# Patient Record
Sex: FEMALE | Race: WHITE | NOT HISPANIC OR LATINO | Employment: OTHER | URBAN - METROPOLITAN AREA
[De-identification: names, ages, dates, MRNs, and addresses within clinical notes are randomized per-mention and may not be internally consistent; named-entity substitution may affect disease eponyms.]

---

## 2017-07-17 ENCOUNTER — COMMUNICATION - HEALTHEAST (OUTPATIENT)
Dept: FAMILY MEDICINE | Facility: CLINIC | Age: 67
End: 2017-07-17

## 2017-07-17 DIAGNOSIS — E03.9 HYPOTHYROIDISM: ICD-10-CM

## 2017-09-22 ENCOUNTER — COMMUNICATION - HEALTHEAST (OUTPATIENT)
Dept: TELEHEALTH | Facility: CLINIC | Age: 67
End: 2017-09-22

## 2017-09-22 ENCOUNTER — OFFICE VISIT - HEALTHEAST (OUTPATIENT)
Dept: FAMILY MEDICINE | Facility: CLINIC | Age: 67
End: 2017-09-22

## 2017-09-22 DIAGNOSIS — E28.319 ASYMPTOMATIC PREMATURE MENOPAUSE: ICD-10-CM

## 2017-09-22 DIAGNOSIS — Z23 IMMUNIZATION DUE: ICD-10-CM

## 2017-09-22 DIAGNOSIS — G43.909 MIGRAINE: ICD-10-CM

## 2017-09-22 DIAGNOSIS — Z76.89 ENCOUNTER TO ESTABLISH CARE WITH NEW DOCTOR: ICD-10-CM

## 2017-09-22 DIAGNOSIS — Z12.31 ENCOUNTER FOR SCREENING MAMMOGRAM FOR MALIGNANT NEOPLASM OF BREAST: ICD-10-CM

## 2017-09-22 DIAGNOSIS — Z13.1 SCREENING FOR DIABETES MELLITUS: ICD-10-CM

## 2017-09-22 DIAGNOSIS — E03.9 ACQUIRED HYPOTHYROIDISM: ICD-10-CM

## 2017-09-22 DIAGNOSIS — E78.00 PURE HYPERCHOLESTEROLEMIA: ICD-10-CM

## 2017-09-22 LAB
CHOLEST SERPL-MCNC: 215 MG/DL
FASTING STATUS PATIENT QL REPORTED: YES
HDLC SERPL-MCNC: 66 MG/DL
LDLC SERPL CALC-MCNC: 128 MG/DL
TRIGL SERPL-MCNC: 103 MG/DL

## 2017-09-24 ENCOUNTER — COMMUNICATION - HEALTHEAST (OUTPATIENT)
Dept: FAMILY MEDICINE | Facility: CLINIC | Age: 67
End: 2017-09-24

## 2017-10-16 ENCOUNTER — COMMUNICATION - HEALTHEAST (OUTPATIENT)
Dept: FAMILY MEDICINE | Facility: CLINIC | Age: 67
End: 2017-10-16

## 2017-10-16 DIAGNOSIS — E03.9 HYPOTHYROIDISM: ICD-10-CM

## 2017-12-19 ENCOUNTER — COMMUNICATION - HEALTHEAST (OUTPATIENT)
Dept: FAMILY MEDICINE | Facility: CLINIC | Age: 67
End: 2017-12-19

## 2017-12-19 DIAGNOSIS — G43.909 MIGRAINE HEADACHE: ICD-10-CM

## 2018-04-30 ENCOUNTER — COMMUNICATION - HEALTHEAST (OUTPATIENT)
Dept: FAMILY MEDICINE | Facility: CLINIC | Age: 68
End: 2018-04-30

## 2018-05-29 ENCOUNTER — COMMUNICATION - HEALTHEAST (OUTPATIENT)
Dept: FAMILY MEDICINE | Facility: CLINIC | Age: 68
End: 2018-05-29

## 2018-06-01 ENCOUNTER — COMMUNICATION - HEALTHEAST (OUTPATIENT)
Dept: TELEHEALTH | Facility: CLINIC | Age: 68
End: 2018-06-01

## 2018-06-01 ENCOUNTER — OFFICE VISIT - HEALTHEAST (OUTPATIENT)
Dept: FAMILY MEDICINE | Facility: CLINIC | Age: 68
End: 2018-06-01

## 2018-06-01 DIAGNOSIS — Z12.11 ENCOUNTER FOR SCREENING FOR MALIGNANT NEOPLASM OF COLON: ICD-10-CM

## 2018-06-01 DIAGNOSIS — Z12.4 ENCOUNTER FOR SCREENING FOR MALIGNANT NEOPLASM OF CERVIX: ICD-10-CM

## 2018-06-01 DIAGNOSIS — Z13.220 ENCOUNTER FOR SCREENING FOR LIPOID DISORDERS: ICD-10-CM

## 2018-06-01 DIAGNOSIS — E03.9 ACQUIRED HYPOTHYROIDISM: ICD-10-CM

## 2018-06-01 DIAGNOSIS — Z78.0 POSTMENOPAUSAL: ICD-10-CM

## 2018-06-01 DIAGNOSIS — Z01.419 ENCOUNTER FOR GYNECOLOGICAL EXAMINATION WITHOUT ABNORMAL FINDING: ICD-10-CM

## 2018-06-01 DIAGNOSIS — Z12.31 ENCOUNTER FOR SCREENING MAMMOGRAM FOR MALIGNANT NEOPLASM OF BREAST: ICD-10-CM

## 2018-06-01 DIAGNOSIS — Z00.00 MEDICARE ANNUAL WELLNESS VISIT, INITIAL: ICD-10-CM

## 2018-06-01 LAB
CHOLEST SERPL-MCNC: 207 MG/DL
FASTING STATUS PATIENT QL REPORTED: YES
HDLC SERPL-MCNC: 59 MG/DL
LDLC SERPL CALC-MCNC: 134 MG/DL
TRIGL SERPL-MCNC: 72 MG/DL

## 2018-06-01 ASSESSMENT — MIFFLIN-ST. JEOR: SCORE: 1257.87

## 2018-06-04 LAB
HPV SOURCE: NORMAL
HUMAN PAPILLOMA VIRUS 16 DNA: NEGATIVE
HUMAN PAPILLOMA VIRUS 18 DNA: NEGATIVE
HUMAN PAPILLOMA VIRUS FINAL DIAGNOSIS: NORMAL
HUMAN PAPILLOMA VIRUS OTHER HR: NEGATIVE
SPECIMEN DESCRIPTION: NORMAL

## 2018-06-07 ENCOUNTER — COMMUNICATION - HEALTHEAST (OUTPATIENT)
Dept: FAMILY MEDICINE | Facility: CLINIC | Age: 68
End: 2018-06-07

## 2018-06-08 ENCOUNTER — COMMUNICATION - HEALTHEAST (OUTPATIENT)
Dept: FAMILY MEDICINE | Facility: CLINIC | Age: 68
End: 2018-06-08

## 2018-06-08 LAB
BKR LAB AP ABNORMAL BLEEDING: NO
BKR LAB AP BIRTH CONTROL/HORMONES: NORMAL
BKR LAB AP CERVICAL APPEARANCE: NORMAL
BKR LAB AP GYN ADEQUACY: NORMAL
BKR LAB AP GYN INTERPRETATION: NORMAL
BKR LAB AP HPV REFLEX: NORMAL
BKR LAB AP LMP: NORMAL
BKR LAB AP PATIENT STATUS: NORMAL
BKR LAB AP PREVIOUS ABNORMAL: NO
BKR LAB AP PREVIOUS NORMAL: 2012
HIGH RISK?: NO
PATH REPORT.COMMENTS IMP SPEC: NORMAL
RESULT FLAG (HE HISTORICAL CONVERSION): NORMAL

## 2018-06-19 ENCOUNTER — RECORDS - HEALTHEAST (OUTPATIENT)
Dept: ADMINISTRATIVE | Facility: OTHER | Age: 68
End: 2018-06-19

## 2018-06-20 ENCOUNTER — HOSPITAL ENCOUNTER (OUTPATIENT)
Dept: MAMMOGRAPHY | Facility: CLINIC | Age: 68
Discharge: HOME | End: 2018-06-20
Attending: FAMILY MEDICINE

## 2018-06-20 ENCOUNTER — RECORDS - HEALTHEAST (OUTPATIENT)
Dept: BONE DENSITY | Facility: CLINIC | Age: 68
End: 2018-06-20

## 2018-06-20 ENCOUNTER — RECORDS - HEALTHEAST (OUTPATIENT)
Dept: ADMINISTRATIVE | Facility: OTHER | Age: 68
End: 2018-06-20

## 2018-06-20 DIAGNOSIS — Z78.0 ASYMPTOMATIC MENOPAUSAL STATE: ICD-10-CM

## 2018-06-20 DIAGNOSIS — Z12.31 ENCOUNTER FOR SCREENING MAMMOGRAM FOR MALIGNANT NEOPLASM OF BREAST: ICD-10-CM

## 2018-07-24 ENCOUNTER — COMMUNICATION - HEALTHEAST (OUTPATIENT)
Dept: FAMILY MEDICINE | Facility: CLINIC | Age: 68
End: 2018-07-24

## 2018-08-01 ENCOUNTER — COMMUNICATION - HEALTHEAST (OUTPATIENT)
Dept: FAMILY MEDICINE | Facility: CLINIC | Age: 68
End: 2018-08-01

## 2018-08-01 DIAGNOSIS — E03.9 HYPOTHYROIDISM: ICD-10-CM

## 2018-09-17 ENCOUNTER — HOSPITAL ENCOUNTER (OUTPATIENT)
Dept: MAMMOGRAPHY | Facility: CLINIC | Age: 68
Discharge: HOME | End: 2018-09-17
Attending: FAMILY MEDICINE

## 2018-11-05 ENCOUNTER — COMMUNICATION - HEALTHEAST (OUTPATIENT)
Dept: FAMILY MEDICINE | Facility: CLINIC | Age: 68
End: 2018-11-05

## 2018-11-05 DIAGNOSIS — E03.9 HYPOTHYROIDISM: ICD-10-CM

## 2018-11-05 DIAGNOSIS — E03.9 HYPOTHYROID: ICD-10-CM

## 2018-11-13 ENCOUNTER — AMBULATORY - HEALTHEAST (OUTPATIENT)
Dept: NURSING | Facility: CLINIC | Age: 68
End: 2018-11-13

## 2018-11-13 ENCOUNTER — COMMUNICATION - HEALTHEAST (OUTPATIENT)
Dept: FAMILY MEDICINE | Facility: CLINIC | Age: 68
End: 2018-11-13

## 2018-11-13 ENCOUNTER — AMBULATORY - HEALTHEAST (OUTPATIENT)
Dept: LAB | Facility: CLINIC | Age: 68
End: 2018-11-13

## 2018-11-13 DIAGNOSIS — E03.9 HYPOTHYROIDISM: ICD-10-CM

## 2018-11-13 DIAGNOSIS — Z23 NEED FOR IMMUNIZATION AGAINST INFLUENZA: ICD-10-CM

## 2018-11-13 LAB — TSH SERPL DL<=0.005 MIU/L-ACNC: 0.61 UIU/ML (ref 0.3–5)

## 2018-11-23 ENCOUNTER — COMMUNICATION - HEALTHEAST (OUTPATIENT)
Dept: FAMILY MEDICINE | Facility: CLINIC | Age: 68
End: 2018-11-23

## 2018-11-23 DIAGNOSIS — E03.9 HYPOTHYROIDISM: ICD-10-CM

## 2019-03-17 ENCOUNTER — COMMUNICATION - HEALTHEAST (OUTPATIENT)
Dept: FAMILY MEDICINE | Facility: CLINIC | Age: 69
End: 2019-03-17

## 2019-03-17 DIAGNOSIS — G43.909 MIGRAINE HEADACHE: ICD-10-CM

## 2019-11-27 ENCOUNTER — COMMUNICATION - HEALTHEAST (OUTPATIENT)
Dept: FAMILY MEDICINE | Facility: CLINIC | Age: 69
End: 2019-11-27

## 2019-11-27 DIAGNOSIS — E03.9 ACQUIRED HYPOTHYROIDISM: ICD-10-CM

## 2020-01-02 ENCOUNTER — COMMUNICATION - HEALTHEAST (OUTPATIENT)
Dept: FAMILY MEDICINE | Facility: CLINIC | Age: 70
End: 2020-01-02

## 2020-01-02 DIAGNOSIS — E03.9 ACQUIRED HYPOTHYROIDISM: ICD-10-CM

## 2020-01-06 ENCOUNTER — COMMUNICATION - HEALTHEAST (OUTPATIENT)
Dept: TELEHEALTH | Facility: CLINIC | Age: 70
End: 2020-01-06

## 2020-01-06 ENCOUNTER — OFFICE VISIT - HEALTHEAST (OUTPATIENT)
Dept: FAMILY MEDICINE | Facility: CLINIC | Age: 70
End: 2020-01-06

## 2020-01-06 DIAGNOSIS — B35.1 TOENAIL FUNGUS: ICD-10-CM

## 2020-01-06 DIAGNOSIS — G43.709 CHRONIC MIGRAINE WITHOUT AURA WITHOUT STATUS MIGRAINOSUS, NOT INTRACTABLE: ICD-10-CM

## 2020-01-06 DIAGNOSIS — E03.9 ACQUIRED HYPOTHYROIDISM: ICD-10-CM

## 2020-01-06 DIAGNOSIS — M81.0 AGE RELATED OSTEOPOROSIS, UNSPECIFIED PATHOLOGICAL FRACTURE PRESENCE: ICD-10-CM

## 2020-01-06 DIAGNOSIS — Z11.59 ENCOUNTER FOR HEPATITIS C SCREENING TEST FOR LOW RISK PATIENT: ICD-10-CM

## 2020-01-06 DIAGNOSIS — E78.00 PURE HYPERCHOLESTEROLEMIA: ICD-10-CM

## 2020-01-06 DIAGNOSIS — J02.9 SORE THROAT: ICD-10-CM

## 2020-01-06 DIAGNOSIS — Z23 IMMUNIZATION DUE: ICD-10-CM

## 2020-01-06 LAB
ALBUMIN SERPL-MCNC: 3.7 G/DL (ref 3.5–5)
ALP SERPL-CCNC: 97 U/L (ref 45–120)
ALT SERPL W P-5'-P-CCNC: 11 U/L (ref 0–45)
ANION GAP SERPL CALCULATED.3IONS-SCNC: 10 MMOL/L (ref 5–18)
AST SERPL W P-5'-P-CCNC: 15 U/L (ref 0–40)
BILIRUB SERPL-MCNC: 0.7 MG/DL (ref 0–1)
BUN SERPL-MCNC: 20 MG/DL (ref 8–22)
CALCIUM SERPL-MCNC: 9.4 MG/DL (ref 8.5–10.5)
CHLORIDE BLD-SCNC: 104 MMOL/L (ref 98–107)
CHOLEST SERPL-MCNC: 200 MG/DL
CO2 SERPL-SCNC: 25 MMOL/L (ref 22–31)
CREAT SERPL-MCNC: 0.85 MG/DL (ref 0.6–1.1)
DEPRECATED S PYO AG THROAT QL EIA: NORMAL
FASTING STATUS PATIENT QL REPORTED: NO
GFR SERPL CREATININE-BSD FRML MDRD: >60 ML/MIN/1.73M2
GLUCOSE BLD-MCNC: 81 MG/DL (ref 70–125)
HDLC SERPL-MCNC: 50 MG/DL
LDLC SERPL CALC-MCNC: 134 MG/DL
POTASSIUM BLD-SCNC: 4.2 MMOL/L (ref 3.5–5)
PROT SERPL-MCNC: 6.5 G/DL (ref 6–8)
SODIUM SERPL-SCNC: 139 MMOL/L (ref 136–145)
TRIGL SERPL-MCNC: 81 MG/DL
TSH SERPL DL<=0.005 MIU/L-ACNC: 0.66 UIU/ML (ref 0.3–5)

## 2020-01-06 ASSESSMENT — MIFFLIN-ST. JEOR: SCORE: 1214.22

## 2020-01-07 ENCOUNTER — COMMUNICATION - HEALTHEAST (OUTPATIENT)
Dept: FAMILY MEDICINE | Facility: CLINIC | Age: 70
End: 2020-01-07

## 2020-01-07 ENCOUNTER — RECORDS - HEALTHEAST (OUTPATIENT)
Dept: ADMINISTRATIVE | Facility: OTHER | Age: 70
End: 2020-01-07

## 2020-01-07 LAB
25(OH)D3 SERPL-MCNC: 21.1 NG/ML (ref 30–80)
GROUP A STREP BY PCR: NORMAL
HCV AB SERPL QL IA: NEGATIVE

## 2020-12-13 ENCOUNTER — COMMUNICATION - HEALTHEAST (OUTPATIENT)
Dept: FAMILY MEDICINE | Facility: CLINIC | Age: 70
End: 2020-12-13

## 2020-12-13 DIAGNOSIS — G43.709 CHRONIC MIGRAINE WITHOUT AURA WITHOUT STATUS MIGRAINOSUS, NOT INTRACTABLE: ICD-10-CM

## 2021-01-02 ENCOUNTER — COMMUNICATION - HEALTHEAST (OUTPATIENT)
Dept: FAMILY MEDICINE | Facility: CLINIC | Age: 71
End: 2021-01-02

## 2021-01-02 DIAGNOSIS — E03.9 ACQUIRED HYPOTHYROIDISM: ICD-10-CM

## 2021-01-08 ENCOUNTER — COMMUNICATION - HEALTHEAST (OUTPATIENT)
Dept: FAMILY MEDICINE | Facility: CLINIC | Age: 71
End: 2021-01-08

## 2021-01-08 DIAGNOSIS — G43.709 CHRONIC MIGRAINE WITHOUT AURA WITHOUT STATUS MIGRAINOSUS, NOT INTRACTABLE: ICD-10-CM

## 2021-01-30 ENCOUNTER — COMMUNICATION - HEALTHEAST (OUTPATIENT)
Dept: FAMILY MEDICINE | Facility: CLINIC | Age: 71
End: 2021-01-30

## 2021-01-30 DIAGNOSIS — E03.9 ACQUIRED HYPOTHYROIDISM: ICD-10-CM

## 2021-02-23 ENCOUNTER — COMMUNICATION - HEALTHEAST (OUTPATIENT)
Dept: FAMILY MEDICINE | Facility: CLINIC | Age: 71
End: 2021-02-23

## 2021-02-23 DIAGNOSIS — E03.9 ACQUIRED HYPOTHYROIDISM: ICD-10-CM

## 2021-03-16 ENCOUNTER — AMBULATORY - HEALTHEAST (OUTPATIENT)
Dept: FAMILY MEDICINE | Facility: CLINIC | Age: 71
End: 2021-03-16

## 2021-03-16 ENCOUNTER — OFFICE VISIT - HEALTHEAST (OUTPATIENT)
Dept: FAMILY MEDICINE | Facility: CLINIC | Age: 71
End: 2021-03-16

## 2021-03-16 DIAGNOSIS — E78.00 PURE HYPERCHOLESTEROLEMIA: ICD-10-CM

## 2021-03-16 DIAGNOSIS — Z12.11 SCREENING FOR COLON CANCER: ICD-10-CM

## 2021-03-16 DIAGNOSIS — M81.0 AGE RELATED OSTEOPOROSIS, UNSPECIFIED PATHOLOGICAL FRACTURE PRESENCE: ICD-10-CM

## 2021-03-16 DIAGNOSIS — G43.709 CHRONIC MIGRAINE WITHOUT AURA WITHOUT STATUS MIGRAINOSUS, NOT INTRACTABLE: ICD-10-CM

## 2021-03-16 DIAGNOSIS — Z12.31 ENCOUNTER FOR SCREENING MAMMOGRAM FOR MALIGNANT NEOPLASM OF BREAST: ICD-10-CM

## 2021-03-16 DIAGNOSIS — E03.9 ACQUIRED HYPOTHYROIDISM: ICD-10-CM

## 2021-03-16 DIAGNOSIS — E55.9 VITAMIN D DEFICIENCY: ICD-10-CM

## 2021-03-16 LAB
ANION GAP SERPL CALCULATED.3IONS-SCNC: 9 MMOL/L (ref 5–18)
BUN SERPL-MCNC: 21 MG/DL (ref 8–22)
CALCIUM SERPL-MCNC: 9.1 MG/DL (ref 8.5–10.5)
CHLORIDE BLD-SCNC: 107 MMOL/L (ref 98–107)
CHOLEST SERPL-MCNC: 224 MG/DL
CO2 SERPL-SCNC: 24 MMOL/L (ref 22–31)
CREAT SERPL-MCNC: 0.86 MG/DL (ref 0.6–1.1)
FASTING STATUS PATIENT QL REPORTED: YES
GFR SERPL CREATININE-BSD FRML MDRD: >60 ML/MIN/1.73M2
GLUCOSE BLD-MCNC: 93 MG/DL (ref 70–125)
HDLC SERPL-MCNC: 58 MG/DL
LDLC SERPL CALC-MCNC: 149 MG/DL
POTASSIUM BLD-SCNC: 4 MMOL/L (ref 3.5–5)
SODIUM SERPL-SCNC: 140 MMOL/L (ref 136–145)
TRIGL SERPL-MCNC: 87 MG/DL
TSH SERPL DL<=0.005 MIU/L-ACNC: 0.4 UIU/ML (ref 0.3–5)

## 2021-03-16 ASSESSMENT — MIFFLIN-ST. JEOR: SCORE: 1270.24

## 2021-03-17 LAB — 25(OH)D3 SERPL-MCNC: 26.6 NG/ML (ref 30–80)

## 2021-03-19 ENCOUNTER — COMMUNICATION - HEALTHEAST (OUTPATIENT)
Dept: FAMILY MEDICINE | Facility: CLINIC | Age: 71
End: 2021-03-19

## 2021-03-19 DIAGNOSIS — E03.9 ACQUIRED HYPOTHYROIDISM: ICD-10-CM

## 2021-03-19 DIAGNOSIS — G43.709 CHRONIC MIGRAINE WITHOUT AURA WITHOUT STATUS MIGRAINOSUS, NOT INTRACTABLE: ICD-10-CM

## 2021-03-22 RX ORDER — LEVOTHYROXINE SODIUM 88 UG/1
TABLET ORAL
Qty: 90 TABLET | Refills: 3 | Status: SHIPPED | OUTPATIENT
Start: 2021-03-22 | End: 2022-03-29

## 2021-03-23 RX ORDER — SUMATRIPTAN 50 MG/1
50 TABLET, FILM COATED ORAL
Qty: 12 TABLET | Refills: 3 | Status: SHIPPED | OUTPATIENT
Start: 2021-03-23 | End: 2022-05-05

## 2021-05-01 ENCOUNTER — HEALTH MAINTENANCE LETTER (OUTPATIENT)
Age: 71
End: 2021-05-01

## 2021-05-25 ENCOUNTER — RECORDS - HEALTHEAST (OUTPATIENT)
Dept: ADMINISTRATIVE | Facility: CLINIC | Age: 71
End: 2021-05-25

## 2021-05-26 ENCOUNTER — RECORDS - HEALTHEAST (OUTPATIENT)
Dept: ADMINISTRATIVE | Facility: CLINIC | Age: 71
End: 2021-05-26

## 2021-05-27 ENCOUNTER — RECORDS - HEALTHEAST (OUTPATIENT)
Dept: ADMINISTRATIVE | Facility: CLINIC | Age: 71
End: 2021-05-27

## 2021-05-29 ENCOUNTER — RECORDS - HEALTHEAST (OUTPATIENT)
Dept: ADMINISTRATIVE | Facility: CLINIC | Age: 71
End: 2021-05-29

## 2021-05-31 ENCOUNTER — RECORDS - HEALTHEAST (OUTPATIENT)
Dept: ADMINISTRATIVE | Facility: CLINIC | Age: 71
End: 2021-05-31

## 2021-05-31 VITALS — WEIGHT: 154.3 LBS | BODY MASS INDEX: 24.17 KG/M2

## 2021-06-01 VITALS — HEIGHT: 67 IN | BODY MASS INDEX: 23.92 KG/M2 | WEIGHT: 152.4 LBS

## 2021-06-03 NOTE — TELEPHONE ENCOUNTER
RN cannot approve Refill Request    RN can NOT refill this medication PCP messaged that patient is overdue for Labs and Office Visit. Last office visit: 9/22/2017 Bertha Barrow MD Last Physical: 6/1/2018 Last MTM visit: Visit date not found Last visit same specialty: 9/22/2017 Bertha Barrow MD.  Next visit within 3 mo: Visit date not found  Next physical within 3 mo: Visit date not found      Guillermo Christianson, [facility] Triage/Med Refill 11/28/2019    Requested Prescriptions   Pending Prescriptions Disp Refills     levothyroxine (SYNTHROID, LEVOTHROID) 88 MCG tablet [Pharmacy Med Name: LEVOTHYROXINE 88 MCG TABLET] 90 tablet 2     Sig: TAKE 1 TABLET BY MOUTH DAILY AT 6AM       Thyroid Hormones Protocol Failed - 11/27/2019  6:25 AM        Failed - Provider visit in past 12 months or next 3 months     Last office visit with prescriber/PCP: 9/22/2017 Bertha Barrow MD OR same dept: Visit date not found OR same specialty: 9/22/2017 Bertha Barrow MD  Last physical: 6/1/2018 Last MTM visit: Visit date not found   Next visit within 3 mo: Visit date not found  Next physical within 3 mo: Visit date not found  Prescriber OR PCP: Bertha Barrow MD (Betsy)  Last diagnosis associated with med order: There are no diagnoses linked to this encounter.  If protocol passes may refill for 12 months if within 3 months of last provider visit (or a total of 15 months).             Failed - TSH on file in past 12 months for patient age 12 & older     TSH   Date Value Ref Range Status   11/13/2018 0.61 0.30 - 5.00 uIU/mL Final

## 2021-06-04 VITALS
SYSTOLIC BLOOD PRESSURE: 124 MMHG | WEIGHT: 152.4 LBS | HEART RATE: 72 BPM | HEIGHT: 65 IN | DIASTOLIC BLOOD PRESSURE: 72 MMHG | BODY MASS INDEX: 25.39 KG/M2

## 2021-06-04 NOTE — TELEPHONE ENCOUNTER
RN cannot approve Refill Request    RN can NOT refill this medication PCP messaged that patient is overdue for Labs and Office Visit. Last office visit: Visit date not found Last Physical: Visit date not found Last MTM visit: Visit date not found Last visit same specialty: 9/22/2017 Bertha Barrow MD.  Next visit within 3 mo: Visit date not found  Next physical within 3 mo: Visit date not found      Guillermo Christianson, [facility] Triage/Med Refill 1/4/2020    Requested Prescriptions   Pending Prescriptions Disp Refills     levothyroxine (SYNTHROID, LEVOTHROID) 88 MCG tablet [Pharmacy Med Name: LEVOTHYROXINE 88 MCG TABLET] 30 tablet 0     Sig: TAKE 1 TABLET BY MOUTH DAILY AT 6AM       Thyroid Hormones Protocol Failed - 1/2/2020  1:36 PM        Failed - Provider visit in past 12 months or next 3 months     Last office visit with prescriber/PCP: Visit date not found OR same dept: Visit date not found OR same specialty: 9/22/2017 Bertha Barrow MD  Last physical: Visit date not found Last MTM visit: Visit date not found   Next visit within 3 mo: Visit date not found  Next physical within 3 mo: Visit date not found  Prescriber OR PCP: Ely Lay MD  Last diagnosis associated with med order: 1. Acquired hypothyroidism  - levothyroxine (SYNTHROID, LEVOTHROID) 88 MCG tablet [Pharmacy Med Name: LEVOTHYROXINE 88 MCG TABLET]; TAKE 1 TABLET BY MOUTH DAILY AT 6AM  Dispense: 30 tablet; Refill: 0    If protocol passes may refill for 12 months if within 3 months of last provider visit (or a total of 15 months).             Failed - TSH on file in past 12 months for patient age 12 & older     TSH   Date Value Ref Range Status   11/13/2018 0.61 0.30 - 5.00 uIU/mL Final

## 2021-06-04 NOTE — TELEPHONE ENCOUNTER
Patient is asking for refill of thyroid medication but has not been seen in a year and a half and has not had a lab and needs an appointment.  Please call the patient and let her know she cannot get refills without scheduling an appointment.  When she is scheduled, must be within 30 days from now, I will give her a 30-day refill.  Please send this message back to me.

## 2021-06-05 VITALS
WEIGHT: 156 LBS | DIASTOLIC BLOOD PRESSURE: 78 MMHG | HEART RATE: 70 BPM | HEIGHT: 67 IN | BODY MASS INDEX: 24.48 KG/M2 | OXYGEN SATURATION: 98 % | SYSTOLIC BLOOD PRESSURE: 124 MMHG

## 2021-06-05 NOTE — TELEPHONE ENCOUNTER
Pt was seen today. Left message on voicemail prior to seeing this appt.     Disregard request for visit. H.DeGree, CMA

## 2021-06-05 NOTE — PROGRESS NOTES
"  SUBJECTIVE: Joseline Welch is a 69 y.o. White or  female who presents today for her med check but also has some complaints.  I have not seen her since June 2018 when she had her welcome to Medicare exam.  She has a history of hypothyroidism and I refused to refill meds until she was seen for lab work.  She also has some mild diet-controlled hyperlipidemia which needs to be followed.  She has osteoporosis and a history of migraine for which she needs medication refilled.  Today she complains of sore throat that she has had for the last 2 weeks.  She also has a cough that goes with it.  We discussed it is likely viral in nature.  We discussed that likely does not need to be treated with antibiotic.  She also shows me her big toe on her right foot.  She has a dark lesion underneath the nail.  She, on questioning, is unsure how long she has had it.  She thinks perhaps at least 2 months.  She denies any trauma to the toe.  She cannot tell me if it seems to be growing out along with the nail.  We discussed that is likely a fungal infection, but I could not really rule out melanoma and so I suggest she be seen by dermatology.  We also discussed that she is due for some immunizations.    OBJECTIVE: /72 (Patient Site: Right Arm, Patient Position: Sitting, Cuff Size: Adult Regular)   Pulse 72   Ht 5' 4.5\" (1.638 m)   Wt 152 lb 6.4 oz (69.1 kg)   Breastfeeding No   BMI 25.76 kg/m    General: Relatively healthy appearing normal weight older female in no acute distress  HEENT: Eyes clear, nose without noticeable rhinorrhea, throat is mildly erythematous without lesions or exudates.  Heart: Regular rate and rhythm without murmur  Lungs: Clear bilaterally  Abdomen: Soft, nontender  Extremities: Warm, dry and without edema.  Right great toe has a darkened lesion underneath the toenail measuring about half centimeter.    Rapid strep was negative    ASSESSMENT & PLAN:     1. Sore throat  Nursing swab the " patient at patient's request.  Likely viral in nature.  - Rapid Strep A Screen- Throat Swab  - Group A Strep, RNA Direct Detection, Throat    2. Acquired hypothyroidism  Needs monitoring for refill of medication.  - Thyroid Stimulating Hormone (TSH)  - levothyroxine (SYNTHROID, LEVOTHROID) 88 MCG tablet; Take 1 tablet (88 mcg total) by mouth Daily at 6:00 am.  Dispense: 90 tablet; Refill: 3    3. Essential Hypercholesterolemia  Mild and diet controlled, needs monitoring  - Lipid Cascade  - Comprehensive Metabolic Panel    4. Age related osteoporosis, unspecified pathological fracture presence  Will monitor her vitamin D level.  - Vitamin D, Total (25-Hydroxy)    5. Chronic migraine without aura without status migrainosus, not intractable  In need of refills today.  Relatively well controlled overall.  - SUMAtriptan (IMITREX) 50 MG tablet; TAKE 1 TABLET BY MOUTH AT IMMEDIATE ONSET OF HEADACHE. MAY REPEAT DOSE ONE TIME.  Dispense: 9 tablet; Refill: 2    6. Toenail fungus  I cannot rule out melanoma and since this is under the nail I would like her to see dermatology.  They can discuss treatment.  - Ambulatory referral to Dermatology    7. Immunization due  Willing to do these today.  - Pneumococcal polysaccharide vaccine 23-valent greater than or equal to 1yo subcutaneous/IM  - Influenza High Dose,Seasonal,PF 65+ Yrs    8. Encounter for hepatitis C screening test for low risk patient  - Hepatitis C Antibody (Anti-HCV)    I will get back to her on her lab results by mail and only call with grossly abnormal values.  Referrals given to Derm.  Patient received immunizations.  She can see me back in 1 years time if all is well.    40 minutes spent together with the patient today, more than 50% spent in counseling, discussing the above topics.        Patient Active Problem List   Diagnosis     Hypothyroidism     Essential Hypercholesterolemia     Migraine Headache     Insomnia     Age related osteoporosis       No current  outpatient medications on file prior to visit.     No current facility-administered medications on file prior to visit.

## 2021-06-13 NOTE — TELEPHONE ENCOUNTER
Refill Approved    Rx renewed per Medication Renewal Policy. Medication was last renewed on 1/6/20.    Sandra Sunshine, [facility] Triage/Med Refill 12/14/2020     Requested Prescriptions   Pending Prescriptions Disp Refills     SUMAtriptan (IMITREX) 50 MG tablet [Pharmacy Med Name: SUMATRIPTAN SUCC 50 MG TABLET] 12 tablet 2     Sig: TAKE 1 TABLET AT ONSET, MAY REPEAT 1 TIME AS NEEDED       Triptans Refill Protocol Passed - 12/13/2020 12:56 PM        Passed - PCP or prescribing provider visit in past 12 months       Last office visit with prescriber/PCP: 1/6/2020 Bertha Barrow MD OR same dept: 1/6/2020 Bertha Barrow MD OR same specialty: 1/6/2020 Bertha Barrow MD  Last physical: 6/1/2018 Last MTM visit: Visit date not found   Next visit within 3 mo: Visit date not found  Next physical within 3 mo: Visit date not found  Prescriber OR PCP: Carla) CHRISTIANO Barrow MD  Last diagnosis associated with med order: 1. Chronic migraine without aura without status migrainosus, not intractable  - SUMAtriptan (IMITREX) 50 MG tablet [Pharmacy Med Name: SUMATRIPTAN SUCC 50 MG TABLET]; TAKE 1 TABLET AT ONSET, MAY REPEAT 1 TIME AS NEEDED  Dispense: 12 tablet; Refill: 2    If protocol passes may refill for 12 months if within 3 months of last provider visit (or a total of 15 months).

## 2021-06-13 NOTE — PROGRESS NOTES
SUBJECTIVE: Joseline Welch is a 67 y.o.  female who presents today for a med check and to establish care with me as Dr. Peterson has retired.  She is relatively healthy and has hyperlipidemia and hypothyroidism.  She is in need of a Synthroid refill and she has not had thyroid lab work in greater than a year.  She also has some migraine headaches and uses Imitrex.  But otherwise is pretty healthy.  She has 5 grandchildren and is quite active in their lives.  For preventative measures she has not had a DEXA scan.  Her last mammogram was in 2012.  She has not had a colonoscopy.  She is due for Pap next year as her last Pap was in April 2015 but no HPV was tested.  She is due for the flu vaccine as well as a Prevnar 13.      We went through her past medical, surgical, family and social histories and they were updated in her chart.    OBJECTIVE: /68  Pulse 72  Wt 154 lb 4.8 oz (70 kg)  BMI 24.17 kg/m2  General: Healthy appearing normal weight older female in no acute distress  Heart: Regular rate and rhythm without murmur  Lungs: Clear bilaterally  Abdomen: Soft, nontender  Extremities: Warm, dry and without edema    ASSESSMENT & PLAN:    1. Acquired hypothyroidism  Thyroid Cascade   2. Essential Hypercholesterolemia  Lipid Cascade FASTING   3. Migraine     4. Screening for diabetes mellitus  Comprehensive Metabolic Panel   5. Encounter for screening mammogram for malignant neoplasm of breast   Mammo Screening Bilateral   6. Asymptomatic premature menopause   DXA Bone Density Scan   7. Immunization due  Pneumococcal conjugate vaccine 13-valent 6wks-17yrs; >50yrs    Influenza, Seasonal,Quad Inj, 36+ MOS   8. Encounter to establish care with new doctor       I am doing the above-mentioned lab work and I will get back to her on those results by mail and only call with grossly abnormal values.  I ordered her a screening mammogram and DEXA scan.  She was given the Prevnar 13 and influenza vaccines  today.  She is to follow-up in 6 months time for a welcome to Medicare exam.    Patient Active Problem List   Diagnosis     Hypothyroidism     Essential Hypercholesterolemia     Migraine Headache     Insomnia       Current Outpatient Prescriptions on File Prior to Visit   Medication Sig Dispense Refill     levothyroxine (SYNTHROID, LEVOTHROID) 88 MCG tablet Take 1 tablet (88 mcg total) by mouth Daily at 6:00 am. 90 tablet 0     SUMAtriptan (IMITREX) 50 MG tablet TAKE 1 TABLET BY MOUTH IMMEDIATELY WITH HEADACHE, MAY REPEAT X1 9 tablet 7     No current facility-administered medications on file prior to visit.

## 2021-06-14 NOTE — TELEPHONE ENCOUNTER
RN cannot approve Refill Request    RN can NOT refill this medication PCP messaged that patient is overdue for Office Visit. Last office visit: 1/6/2020 Bertha Barrow MD Last Physical: 6/1/2018 Last MTM visit: Visit date not found Last visit same specialty: 1/6/2020 Bertha Barrow MD.  Next visit within 3 mo: Visit date not found  Next physical within 3 mo: Visit date not found      Selma Peña, [facility] Triage/Med Refill 1/8/2021    Requested Prescriptions   Pending Prescriptions Disp Refills     SUMAtriptan (IMITREX) 50 MG tablet [Pharmacy Med Name: SUMATRIPTAN SUCC 50 MG TABLET] 12 tablet 0     Sig: TAKE 1 TABLET AT ONSET, MAY REPEAT 1 TIME AS NEEDED       Triptans Refill Protocol Failed - 1/8/2021  2:00 AM        Failed - PCP or prescribing provider visit in past 12 months       Last office visit with prescriber/PCP: 1/6/2020 Bertha Barrow MD OR same dept: Visit date not found OR same specialty: 1/6/2020 Bertha Barrow MD  Last physical: 6/1/2018 Last MTM visit: Visit date not found   Next visit within 3 mo: Visit date not found  Next physical within 3 mo: Visit date not found  Prescriber OR PCP: Bertha Barrow MD (Betsy)  Last diagnosis associated with med order: 1. Chronic migraine without aura without status migrainosus, not intractable  - SUMAtriptan (IMITREX) 50 MG tablet [Pharmacy Med Name: SUMATRIPTAN SUCC 50 MG TABLET]; TAKE 1 TABLET AT ONSET, MAY REPEAT 1 TIME AS NEEDED  Dispense: 12 tablet; Refill: 0    If protocol passes may refill for 12 months if within 3 months of last provider visit (or a total of 15 months).

## 2021-06-14 NOTE — TELEPHONE ENCOUNTER
Patient needs appointment for med check and lab for further refills.  30-day refill of thyroid medicine was sent.  Please get her scheduled.  I have openings tomorrow

## 2021-06-14 NOTE — TELEPHONE ENCOUNTER
RN cannot approve Refill Request    RN can NOT refill this medication PCP messaged that patient is overdue for Office Visit. Last office visit: 1/6/2020 Bertha Barrow MD Last Physical: 6/1/2018 Last MTM visit: Visit date not found Last visit same specialty: 1/6/2020 Bertha Barrow MD.  Next visit within 3 mo: Visit date not found  Next physical within 3 mo: Visit date not found      Selma Peña, [facility] Triage/Med Refill 1/30/2021    Requested Prescriptions   Pending Prescriptions Disp Refills     levothyroxine (SYNTHROID, LEVOTHROID) 88 MCG tablet [Pharmacy Med Name: LEVOTHYROXINE 88 MCG TABLET] 30 tablet 0     Sig: TAKE 1 TABLET BY MOUTH EVERY DAY AT 6 AM       Thyroid Hormones Protocol Failed - 1/30/2021  8:30 AM        Failed - Provider visit in past 12 months or next 3 months     Last office visit with prescriber/PCP: 1/6/2020 Bertha Barrow MD OR same dept: Visit date not found OR same specialty: 1/6/2020 Bertha Barrow MD  Last physical: 6/1/2018 Last MTM visit: Visit date not found   Next visit within 3 mo: Visit date not found  Next physical within 3 mo: Visit date not found  Prescriber OR PCP: Bertha Barrow MD (Betsy)  Last diagnosis associated with med order: 1. Acquired hypothyroidism  - levothyroxine (SYNTHROID, LEVOTHROID) 88 MCG tablet [Pharmacy Med Name: LEVOTHYROXINE 88 MCG TABLET]; TAKE 1 TABLET BY MOUTH EVERY DAY AT 6 AM  Dispense: 30 tablet; Refill: 0    If protocol passes may refill for 12 months if within 3 months of last provider visit (or a total of 15 months).             Failed - TSH on file in past 12 months for patient age 12 & older     TSH   Date Value Ref Range Status   01/06/2020 0.66 0.30 - 5.00 uIU/mL Final

## 2021-06-14 NOTE — TELEPHONE ENCOUNTER
Due to be seen with labs    Rx renewed per Medication Renewal Policy. Medication was last renewed on 1/6/20.    Sandra Sunshine, [facility] Triage/Med Refill 1/4/2021     Requested Prescriptions   Pending Prescriptions Disp Refills     levothyroxine (SYNTHROID, LEVOTHROID) 88 MCG tablet [Pharmacy Med Name: LEVOTHYROXINE 88 MCG TABLET] 90 tablet 3     Sig: TAKE 1 TABLET EVERY DAY AT 6 AM       Thyroid Hormones Protocol Passed - 1/2/2021  9:40 AM        Passed - Provider visit in past 12 months or next 3 months     Last office visit with prescriber/PCP: 1/6/2020 Bertha Barrow MD OR same dept: 1/6/2020 Bertha Barrow MD OR same specialty: 1/6/2020 Bertha Barrow MD  Last physical: 6/1/2018 Last MTM visit: Visit date not found   Next visit within 3 mo: Visit date not found  Next physical within 3 mo: Visit date not found  Prescriber OR PCP: Carla) CHRISTIANO Barrow MD  Last diagnosis associated with med order: 1. Acquired hypothyroidism  - levothyroxine (SYNTHROID, LEVOTHROID) 88 MCG tablet [Pharmacy Med Name: LEVOTHYROXINE 88 MCG TABLET]; TAKE 1 TABLET EVERY DAY AT 6 AM  Dispense: 90 tablet; Refill: 3    If protocol passes may refill for 12 months if within 3 months of last provider visit (or a total of 15 months).             Passed - TSH on file in past 12 months for patient age 12 & older     TSH   Date Value Ref Range Status   01/06/2020 0.66 0.30 - 5.00 uIU/mL Final

## 2021-06-15 NOTE — TELEPHONE ENCOUNTER
Patient is asking for refills of her levothyroxine.  She was given 30 days of medication and was told she needed to follow-up in order to get labs drawn and see me.  If she is willing to schedule and does so, send this request back for 30 days refill.

## 2021-06-15 NOTE — TELEPHONE ENCOUNTER
RN cannot approve Refill Request    RN can NOT refill this medication PCP messaged that patient is overdue for Labs and Office Visit. Last office visit: 1/6/2020 Bertha Barrow MD Last Physical: 6/1/2018 Last MTM visit: Visit date not found Last visit same specialty: 1/6/2020 Bertha Barrow MD.  Next visit within 3 mo: Visit date not found  Next physical within 3 mo: Visit date not found      Lamar Elliott, [facility] Triage/Med Refill 2/23/2021    Requested Prescriptions   Pending Prescriptions Disp Refills     levothyroxine (SYNTHROID, LEVOTHROID) 88 MCG tablet [Pharmacy Med Name: LEVOTHYROXINE 88 MCG TABLET] 30 tablet 0     Sig: TAKE 1 TABLET BY MOUTH EVERY DAY AT 6 AM       Thyroid Hormones Protocol Failed - 2/23/2021 12:30 PM        Failed - Provider visit in past 12 months or next 3 months     Last office visit with prescriber/PCP: 1/6/2020 Bertha Barrow MD OR same dept: Visit date not found OR same specialty: 1/6/2020 Bertha Barrow MD  Last physical: 6/1/2018 Last MTM visit: Visit date not found   Next visit within 3 mo: Visit date not found  Next physical within 3 mo: Visit date not found  Prescriber OR PCP: Bertha Barrow MD (Betsy)  Last diagnosis associated with med order: 1. Acquired hypothyroidism  - levothyroxine (SYNTHROID, LEVOTHROID) 88 MCG tablet [Pharmacy Med Name: LEVOTHYROXINE 88 MCG TABLET]; TAKE 1 TABLET BY MOUTH EVERY DAY AT 6 AM  Dispense: 30 tablet; Refill: 0    If protocol passes may refill for 12 months if within 3 months of last provider visit (or a total of 15 months).             Failed - TSH on file in past 12 months for patient age 12 & older     TSH   Date Value Ref Range Status   01/06/2020 0.66 0.30 - 5.00 uIU/mL Final

## 2021-06-15 NOTE — PROGRESS NOTES
"SUBJECTIVE: Joseline Welch is a 70 y.o. White or  female who presents today for her med check.  I last saw her for labs and visit in January 2020.  She has hypothyroidism and at that time her TSH was good but that needs to be rechecked.  She had a bone density done in June 2018 showing she has osteoporosis.  They suggested she have active treatment and follow-up in a year but she has not wanted to do so.  We discussed whether she is getting adequate calcium and vitamin D.  She was vitamin D deficient prior.  She says she took it for a while but has not recently.  We discussed perhaps rechecking it and then sending her to osteoporosis clinic if it looks like there is continued loss of bone.  I suggested she could do it with her mammogram at the same time.  She says she prefers to go to [facility] for her mammogram as it went quite well when she went in 2018.  She had not done it for a long time prior to that.  She asks me for a refill of her sumatriptan which she uses intermittently.  She says she has more migraines when she is stressed out.  She had a negative Cologuard test in June 2018 and we discussed rechecking that as well this coming June.  She has mild hyperlipidemia that we are watching.    OBJECTIVE: /78   Pulse 70   Ht 5' 7\" (1.702 m)   Wt 156 lb (70.8 kg)   SpO2 98%   BMI 24.43 kg/m    General: Well-appearing normal weight older female in no acute distress  Heart: Regular rate and rhythm without murmur  Lungs: Clear bilaterally  Abdomen: Soft  Extremities: Warm, dry without edema      ASSESSMENT & PLAN:     1. Acquired hypothyroidism  Will refill medication according to lab result.  - Thyroid Cascade    2. Age related osteoporosis, unspecified pathological fracture presence  Agrees for a repeat of the bone density and then possibly a referral to the osteoporosis clinic if needed.  Will check labs.  - DXA Bone Density Scan; Future  - Vitamin D, Total (25-Hydroxy)    3. Essential " Hypercholesterolemia  Diet controlled currently, will check labs.  - Basic Metabolic Panel  - Lipid Cascade FASTING    4. Chronic migraine without aura without status migrainosus, not intractable  Needs refill of her sumatriptan.  - SUMAtriptan (IMITREX) 50 MG tablet; Take 1 tablet (50 mg total) by mouth every 2 (two) hours as needed for migraine.  Dispense: 12 tablet; Refill: 3    5. Encounter for screening mammogram for malignant neoplasm of breast  - Mammo Screening Bilateral; Future    6. Screening for colon cancer  - Cologuard; Future    7. Vitamin D deficiency  - Vitamin D, Total (25-Hydroxy)    I will get back to her on her lab results by mail and only call with grossly abnormal values.  I will refill her meds accordingly.  She should see me back in 1 years time for her annual wellness visit.    Patient Active Problem List   Diagnosis     Hypothyroidism     Essential Hypercholesterolemia     Migraine Headache     Insomnia     Age related osteoporosis       Current Outpatient Medications on File Prior to Visit   Medication Sig Dispense Refill     levothyroxine (SYNTHROID, LEVOTHROID) 88 MCG tablet TAKE 1 TABLET BY MOUTH EVERY DAY AT 6 AM 30 tablet 0     [DISCONTINUED] SUMAtriptan (IMITREX) 50 MG tablet TAKE 1 TABLET AT ONSET, MAY REPEAT 1 TIME AS NEEDED 12 tablet 0     No current facility-administered medications on file prior to visit.

## 2021-06-16 PROBLEM — M81.0 AGE RELATED OSTEOPOROSIS: Chronic | Status: ACTIVE | Noted: 2018-06-21

## 2021-06-16 NOTE — PROGRESS NOTES
This encounter was created solely for the purpose of releasing the future order that was placed for Cologuard.  This is a necessary step in order for the results to be abstracted once they are available.    Arsenio Stapleton

## 2021-06-16 NOTE — TELEPHONE ENCOUNTER
This was filled at [facility] as needed, but her Sumstriptan was sent to [facility] which is no longer her pharmacy.    Please resent Sumatriptan to [facility]

## 2021-06-16 NOTE — TELEPHONE ENCOUNTER
Refill Approved    Rx renewed per Medication Renewal Policy. Medication was last renewed on 2/23/21.    Jorden Goss, [facility] Triage/Med Refill 3/22/2021     Requested Prescriptions   Pending Prescriptions Disp Refills     levothyroxine (SYNTHROID, LEVOTHROID) 88 MCG tablet [Pharmacy Med Name: LEVOTHYROXINE 88 MCG TABLET] 30 tablet 0     Sig: TAKE 1 TABLET BY MOUTH EVERY DAY AT 6 AM       Thyroid Hormones Protocol Passed - 3/19/2021 12:31 PM        Passed - Provider visit in past 12 months or next 3 months     Last office visit with prescriber/PCP: 3/16/2021 Bertha Barrow MD OR same dept: 3/16/2021 Bertha Barrow MD OR same specialty: 3/16/2021 Bertha Barrow MD  Last physical: 6/1/2018 Last MTM visit: Visit date not found   Next visit within 3 mo: Visit date not found  Next physical within 3 mo: Visit date not found  Prescriber OR PCP: Carla) CHRISTIANO Barrow MD  Last diagnosis associated with med order: 1. Acquired hypothyroidism  - levothyroxine (SYNTHROID, LEVOTHROID) 88 MCG tablet [Pharmacy Med Name: LEVOTHYROXINE 88 MCG TABLET]; TAKE 1 TABLET BY MOUTH EVERY DAY AT 6 AM  Dispense: 30 tablet; Refill: 0    If protocol passes may refill for 12 months if within 3 months of last provider visit (or a total of 15 months).             Passed - TSH on file in past 12 months for patient age 12 & older     TSH   Date Value Ref Range Status   03/16/2021 0.40 0.30 - 5.00 uIU/mL Final

## 2021-06-18 NOTE — PROGRESS NOTES
Assessment:      Annual Wellness Visit    1. Medicare annual wellness visit, initial     2. Acquired hypothyroidism     3. Encounter for screening mammogram for malignant neoplasm of breast  Mammo Screening Bilateral   4. Encounter for screening for malignant neoplasm of colon  Ambulatory referral for Colonoscopy   5. Encounter for screening for lipoid disorders  Lipid [place], FASTING    Lipid [place], FASTING   6. Encounter for screening for malignant neoplasm of cervix     7. Encounter for gynecological examination without abnormal finding  Gynecologic Cytology (PAP Smear)    HPV High Risk DNA Cervical   8. Postmenopausal  DXA Bone Density Scan     I have had an Advance Directives discussion with the patient.  The following high BMI interventions were performed this visit: encouragement to exercise, weight monitoring and lifestyle education regarding diet       Plan:       Patient is in need of a lot of preventative screening.  She was given referrals for mammogram, DEXA scan, Darrius guard testing, and we did a Pap today.  A cholesterol was checked today.  I will get back to her on those results by mail and only call with grossly abnormal values.  She will need a future thyroid check.  We can do that as a lab only.  If all is well, she can see me back in 1 years time.      Subjective:      Joseline Welch is a 67 y.o. male who presents for a Initial Annual Wellness Visit.  Patient was initially scheduled for a welcome to Medicare as she turns 68 on Alesha 10 of this year.  Unfortunately, today is June 1 and she is 1 day overdue.  We had to switch this to an annual wellness visit.  Luckily, she is very healthy and only has hypothyroidism which has been very well controlled for 4 years.  She tells me that she has had only one dose change ever.  Her last lab was in September 2017.  She has had some migraine headache and does rarely use  sumatriptan.  She is disappointed that she is not allowed an EKG.  She is due for  a mammogram and DEXA scan.  Her last mammogram was in 2012.  She also needs a colon cancer screening.  We discuss doing a Cologuard which is preferable in her opinion.  She also needs her last Pap today.    Healthy Habits:   Regular Exercise: Yes  Sunscreen Use: Yes  Healthy Diet: No and Admits she is not much of a veggie and fruit eater.  Dental Visits Regularly: Yes  Seat Belt: Yes  Sexually active: No and   Monthly Self Testicular Exams:  N/A  Hemoccults: No  Flex Sig: No  Colonoscopy: No and Willing to do a cologuard test  Lipid Profile: Yes  Glucose Screen: Yes  Prevention of Osteoporosis: No  Last Dexa: N/A  Guns at Home:  N/A      Immunization History   Administered Date(s) Administered     DT (pediatric) 09/12/2002     Influenza, inj, historic,unspecified 10/24/2012, 09/22/2017     Influenza, seasonal,quad inj 36+ mos 09/22/2017     Influenza, seasonal,quad inj 6-35 mos 10/02/2009     Influenza,seasonal quad, PF 11/22/2013     Influenza,seasonal, Inj IIV3 10/25/2010     Pneumo Conj 13-V (2010&after) 09/22/2017     Td,adult,historic,unspecified 09/12/2002     Tdap 06/27/2011     Immunization status: up to date and documented.    Current Outpatient Prescriptions   Medication Sig Dispense Refill     levothyroxine (SYNTHROID, LEVOTHROID) 88 MCG tablet Take 1 tablet by mouth once daily at 6 AM. 90 tablet 2     SUMAtriptan (IMITREX) 50 MG tablet TAKE 1 TABLET BY MOUTH AT IMMEDIATE ONSET OF HEADACHE. MAY REPEAT DOSE ONE TIME. 9 tablet 6     No current facility-administered medications for this visit.      Past Medical History:   Diagnosis Date     Hypothyroidism 1978     Past Surgical History:   Procedure Laterality Date     TONSILLECTOMY  1957     Review of patient's allergies indicates no known allergies.  Family History   Problem Relation Age of Onset     Dementia Mother      Hypothyroidism Mother      Dementia Father      Hypothyroidism Father      Heart disease Maternal Grandmother      Stroke Maternal  Grandfather      Social History     Social History     Marital status:      Spouse name: N/A     Number of children: N/A     Years of education: N/A     Occupational History     Not on file.     Social History Main Topics     Smoking status: Former Smoker     Quit date: 1/1/2004     Smokeless tobacco: Never Used     Alcohol use No     Drug use: No     Sexual activity: Not on file     Other Topics Concern     Not on file     Social History Narrative       Current Diet:  Does not eat adequate fruits and vegetables.  Lives alone and does not always like to cook.  Amount Consumed Per Day -adequate    Exercise Type: walking and stretching/Yoga  Exercise Frequency: 5-7 x per week    Mood Disorder and Cognitive Impairment Screenings  Anxiety Screening Tool:  GAD7       Anxiety Screening Tool Score: 3  Depression Screening Tool:  PHQ2    Depression Screening Tool Score:  0  Cognitive Impairment and Additional Screening:  No difficulty. 5/5 on clock drawing and 3 word memory    Functional Ability/Level of Safety  Fall Risk Factors:  Has not had a fall in the last 12 months  Home Safety Risk Factors: none    Advanced Directive:  I have had an Advanced Directive discussion with the patient. The patient does not have an Advanced Directive.    Co-Managers and Medical Equipment/Suppliers:  n/a    Review of Systems  Review of Systems   Review of Systems -   General ROS: negative  Psychological ROS: positive for - anxiety  Ophthalmic ROS: negative  ENT ROS: negative  Allergy and Immunology ROS: negative  Hematological and Lymphatic ROS: negative  Endocrine ROS: negative  Breast ROS: negative  Respiratory ROS: negative  Cardiovascular ROS: Varicose veins  Gastrointestinal ROS: negative  Genito-Urinary ROS: negative  Musculoskeletal ROS: positive for -foot osteoarthritis with history of bunion  Neurological ROS: negative  Dermatological ROS: negative          Objective:     Vitals:    06/01/18 1021   BP: 130/78   Pulse: 64  "  Resp: 16   Temp: 98.2  F (36.8  C)   TempSrc: Oral   SpO2: 96%   Weight: 152 lb 6.4 oz (69.1 kg)   Height: 5' 7.25\" (1.708 m)            Physical Examination:   General appearance - alert, well appearing, and in no distress and normal appearing weight  Mental status - normal mood, behavior, speech, dress, motor activity, and thought processes  Eyes - pupils equal and reactive, extraocular eye movements intact  Ears - bilateral TM's and external ear canals normal  Nose - normal and patent, no erythema, discharge or polyps  Mouth - mucous membranes moist, pharynx normal without lesions  Neck - supple, no significant adenopathy, carotids upstroke normal bilaterally, no bruits, thyroid exam: thyroid is normal in size without nodules or tenderness  Lymphatics - no palpable lymphadenopathy, no hepatosplenomegaly  Chest - clear to auscultation, no wheezes, rales or rhonchi, symmetric air entry  Heart - normal rate and regular rhythm, S1 and S2 normal, no murmurs noted  Abdomen - soft, nontender, nondistended, no masses or organomegaly  Breasts - breasts appear normal, no suspicious masses, no skin or nipple changes or axillary nodes  Pelvic - exam declined by the patient  Back exam - full range of motion, no tenderness, palpable spasm or pain on motion  Neurological - alert, oriented, normal speech, no focal findings or movement disorder noted, cranial nerves II through XII intact, DTR's normal and symmetric  Musculoskeletal - no joint tenderness, deformity or swelling  Extremities - peripheral pulses normal, no pedal edema, no clubbing or cyanosis, varicose veins noted  Skin - normal coloration and turgor, no rashes, no suspicious skin lesions noted            "

## 2021-06-19 NOTE — LETTER
Letter by Bertha Barrow MD at      Author: Bertha Barrow MD Service: -- Author Type: --    Filed:  Encounter Date: 11/27/2019 Status: Signed         Joseline Welch  [address] [place] [id number]      November 29, 2019      Dear Joseline,    As a valued [facility] patient, your healthcare needs are our priority.  Your health care team has determined that you are due for an appointment regarding your medications.    To help prevent delays in your care, please call the [facility] at [phone]      We look forward to partnering with you to achieve optimal health and wellbeing.    Sincerely,  Your care team at [facility] and [facility]

## 2021-06-20 NOTE — LETTER
Letter by Bertha Barrow MD at      Author: Bertha Barrow MD Service: -- Author Type: --    Filed:  Encounter Date: 1/7/2020 Status: Signed         Joseline Welch  [address] [place] [id number]             January 7, 2020         Dear Ms. Welch,    Below are the results from your recent visit:    Resulted Orders   Hepatitis C Antibody (Anti-HCV)   Result Value Ref Range    Hepatitis C Ab Negative Negative   Thyroid Stimulating Hormone (TSH)   Result Value Ref Range    TSH 0.66 0.30 - 5.00 uIU/mL   Vitamin D, Total (25-Hydroxy)   Result Value Ref Range    Vitamin D, Total (25-Hydroxy) 21.1 (L) 30.0 - 80.0 ng/mL    Narrative    Deficiency <10.0 ng/mL  Insufficiency 10.0-29.9 ng/mL  Sufficiency 30.0-80.0 ng/mL  Toxicity (possible) >100.0 ng/mL   Lipid [place]ascade   Result Value Ref Range    Cholesterol 200 (H) <=199 mg/dL    Triglycerides 81 <=149 mg/dL    HDL Cholesterol 50 >=50 mg/dL    LDL Calculated 134 (H) <=129 mg/dL    Patient Fasting > 8hrs? No    Comprehensive Metabolic Panel   Result Value Ref Range    Sodium 139 136 - 145 mmol/L    Potassium 4.2 3.5 - 5.0 mmol/L    Chloride 104 98 - 107 mmol/L    CO2 25 22 - 31 mmol/L    Anion Gap, Calculation 10 5 - 18 mmol/L    Glucose 81 70 - 125 mg/dL    BUN 20 8 - 22 mg/dL    Creatinine 0.85 0.60 - 1.10 mg/dL    GFR MDRD Af Amer >60 >60 mL/min/1.73m2    GFR MDRD Non Af Amer >60 >60 mL/min/1.73m2    Bilirubin, Total 0.7 0.0 - 1.0 mg/dL    Calcium 9.4 8.5 - 10.5 mg/dL    Protein, Total 6.5 6.0 - 8.0 g/dL    Albumin 3.7 3.5 - 5.0 g/dL    Alkaline Phosphatase 97 45 - 120 U/L    AST 15 0 - 40 U/L    ALT 11 0 - 45 U/L    Narrative    Fasting Glucose reference range is 70-99 mg/dL per  American Diabetes Association (ADA) guidelines.   Rapid Strep A Screen- Throat Swab   Result Value Ref Range    Rapid Strep A Antigen No Group A Strep detected, presumptive negative No Group A Strep detected, presumptive negative   Group A Strep, RNA Direct Detection,  Throat   Result Value Ref Range    Group A Strep by PCR No Group A Strep rRNA detected No Group A Strep rRNA detected    Narrative    Intended Use:  The GEN-PROBE Group A Streptococcus direct test is a DNA probe assay which uses nucleic acid hybridization for the qualitative detection of Group A Streptococcal RNA to aid in the diagnosis of Group A Streptococcal pharyngitis from throat swabs.    Methodology:  The GEN-PROBE DNA probe assay uses a single-stranded DNA probe with a chemiluminescent label, which is complementary to the ribosomal RNA of the target organism.  The labeled DNA probe combines with the ribosomal RNA to form a stable DNA:RNA hybrid.  The labeled DNA:RNA hybrids are measured in GEN-PROBE luminometer.  A positive result is a luminometer reading greater than or equal to the cut-off.  A value below this cut-off is a negative result.         Here is a hard copy of your lab results as I promised.  Remember to supplement with vitamin D every day.  A minimum of 2000 international units daily is usually required.  See you in a year.    Please call with questions or contact us using Bustlet.    Sincerely,        Electronically signed by Bertha Barrow MD (Betsy)

## 2021-06-21 NOTE — LETTER
Letter by Bertha Barrow MD at      Author: Bertha Barrow MD Service: -- Author Type: --    Filed:  Encounter Date: 3/19/2021 Status: (Other)         Joseline Welch  [address] [place] [id number]             March 19, 2021         Dear Ms. Welch,    Below are the results from your recent visit:    Resulted Orders   Thyroid Cascade   Result Value Ref Range    TSH 0.40 0.30 - 5.00 uIU/mL   Basic Metabolic Panel   Result Value Ref Range    Sodium 140 136 - 145 mmol/L    Potassium 4.0 3.5 - 5.0 mmol/L    Chloride 107 98 - 107 mmol/L    CO2 24 22 - 31 mmol/L    Anion Gap, Calculation 9 5 - 18 mmol/L    Glucose 93 70 - 125 mg/dL    Calcium 9.1 8.5 - 10.5 mg/dL    BUN 21 8 - 22 mg/dL    Creatinine 0.86 0.60 - 1.10 mg/dL    GFR MDRD Af Amer >60 >60 mL/min/1.73m2    GFR MDRD Non Af Amer >60 >60 mL/min/1.73m2    Narrative    Fasting Glucose reference range is 70-99 mg/dL per  American Diabetes Association (ADA) guidelines.   Lipid [place] FASTING   Result Value Ref Range    Cholesterol 224 (H) <=199 mg/dL    Triglycerides 87 <=149 mg/dL    HDL Cholesterol 58 >=50 mg/dL    LDL Calculated 149 (H) <=129 mg/dL    Patient Fasting > 8hrs? Yes    Vitamin D, Total (25-Hydroxy)   Result Value Ref Range    Vitamin D, Total (25-Hydroxy) 26.6 (L) 30.0 - 80.0 ng/mL    Narrative    Deficiency <10.0 ng/mL  Insufficiency 10.0-29.9 ng/mL  Sufficiency 30.0-80.0 ng/mL  Toxicity (possible) >100.0 ng/mL       Your vitamin D value is not adequate.  Make sure you are supplementing with somewhere between 2000 and 5000 international units of vitamin D daily.  It needs to be used year-round.  I like to see the result between 40 and 60.    As for your cholesterol:  The 10-year ASCVD risk score (Torito MEDRANO Jr., et al., 2013) is: 7.4%    Values used to calculate the score:      Age: 70 years      Sex: Female      Is Non- : No      Diabetic: No      Tobacco smoker: No      Systolic Blood Pressure: 124  mmHg      Is BP treated: No      HDL Cholesterol: 58 mg/dL      Total Cholesterol: 224 mg/dL  Your cholesterol is not perfect and your risk for heart attack or stroke in the next 10 years is 7.4% which is not horrible but not great either.  You might consider treating this with a small amount of statin medication that is water-soluble and less likely to cause side effects.  The to statins that are water-soluble are pravastatin and rosuvastatin.  If you are willing to start one of these, let me know.    Your other labs all look good.  See you in no longer than 1 year please, sooner if you want to treat your cholesterol.    Please call with questions or contact us using FunCaptchat.    Sincerely,        Electronically signed by Bertha Barrow MD (Betsy)

## 2021-06-25 NOTE — TELEPHONE ENCOUNTER
Refill Approved    Rx renewed per Medication Renewal Policy. Medication was last renewed on 12/20/17.  Pt seen 6/1/18  Sandra Sunshine, [facility] Connection Triage/Med Refill 3/18/2019     Requested Prescriptions   Pending Prescriptions Disp Refills     SUMAtriptan (IMITREX) 50 MG tablet 9 tablet 6    Triptans Refill Protocol Failed - 3/17/2019  2:17 PM       Failed - PCP or prescribing provider visit in past 12 months      Last office visit with prescriber/PCP: 9/22/2017 Bertha Barrow MD OR same dept: Visit date not found OR same specialty: 9/22/2017 Bertha Barrow MD  Last physical: 6/1/2018 Last MTM visit: Visit date not found   Next visit within 3 mo: Visit date not found  Next physical within 3 mo: Visit date not found  Prescriber OR PCP: Bertha Barrow MD (Betsy)  Last diagnosis associated with med order: 1. Migraine headache  - SUMAtriptan (IMITREX) 50 MG tablet  Dispense: 9 tablet; Refill: 6    If protocol passes may refill for 12 months if within 3 months of last provider visit (or a total of 15 months).

## 2021-07-04 NOTE — ADDENDUM NOTE
Addendum Note by Jennifer Saldana at 3/23/2021  2:37 PM     Author: Jennifer Saldana Service: -- Author Type: Patient Access    Filed: 3/23/2021  2:37 PM Encounter Date: 3/19/2021 Status: Signed    : Jennifer Saldana (Patient Access)    Addended by: JENNIFER SALDANA on: 3/23/2021 02:37 PM        Modules accepted: Orders

## 2021-07-21 ENCOUNTER — RECORDS - HEALTHEAST (OUTPATIENT)
Dept: ADMINISTRATIVE | Facility: CLINIC | Age: 71
End: 2021-07-21

## 2021-10-11 ENCOUNTER — HEALTH MAINTENANCE LETTER (OUTPATIENT)
Age: 71
End: 2021-10-11

## 2021-12-14 ENCOUNTER — HOSPITAL ENCOUNTER (OUTPATIENT)
Dept: MAMMOGRAPHY | Facility: CLINIC | Age: 71
Discharge: HOME | End: 2021-12-14
Attending: FAMILY MEDICINE | Admitting: FAMILY MEDICINE
Payer: MEDICARE

## 2021-12-14 DIAGNOSIS — Z12.31 VISIT FOR SCREENING MAMMOGRAM: ICD-10-CM

## 2021-12-14 PROCEDURE — 77063 BREAST TOMOSYNTHESIS BI: CPT

## 2022-03-26 DIAGNOSIS — E03.9 ACQUIRED HYPOTHYROIDISM: ICD-10-CM

## 2022-03-29 DIAGNOSIS — E03.9 ACQUIRED HYPOTHYROIDISM: ICD-10-CM

## 2022-03-29 RX ORDER — LEVOTHYROXINE SODIUM 88 UG/1
TABLET ORAL
Qty: 90 TABLET | Refills: 3 | Status: SHIPPED | OUTPATIENT
Start: 2022-03-29 | End: 2022-03-29

## 2022-03-29 RX ORDER — LEVOTHYROXINE SODIUM 88 UG/1
88 TABLET ORAL DAILY
Qty: 30 TABLET | Refills: 0 | Status: SHIPPED | OUTPATIENT
Start: 2022-03-29 | End: 2022-05-03

## 2022-03-29 NOTE — TELEPHONE ENCOUNTER
Pt notified that she's due for a visit. Will call back as she's not home at this time. Notified that short term supply sent to pharmacy.

## 2022-03-29 NOTE — TELEPHONE ENCOUNTER
"Routing refill request to provider for review/approval because:  Labs not current:  TSH  Patient needs to be seen because it has been more than 1 year since last office visit.    Last Written Prescription Date:  3/22/2021  Last Fill Quantity: 90,  # refills: 3   Last office visit provider:  3/16/2021 Dr. Barrow     Requested Prescriptions   Pending Prescriptions Disp Refills     levothyroxine (SYNTHROID/LEVOTHROID) 88 MCG tablet [Pharmacy Med Name: LEVOTHYROXINE 88 MCG TABLET] 90 tablet 3     Sig: TAKE 1 TABLET BY MOUTH EVERY DAY AT 6 AM       Thyroid Protocol Failed - 3/28/2022  9:37 PM        Failed - Recent (12 mo) or future (30 days) visit within the authorizing provider's specialty     Patient has had an office visit with the authorizing provider or a provider within the authorizing providers department within the previous 12 mos or has a future within next 30 days. See \"Patient Info\" tab in inbasket, or \"Choose Columns\" in Meds & Orders section of the refill encounter.              Failed - Normal TSH on file in past 12 months     Recent Labs   Lab Test 03/16/21  0930   TSH 0.40              Passed - Patient is 12 years or older        Passed - Medication is active on med list        Passed - No active pregnancy on record     If patient is pregnant or has had a positive pregnancy test, please check TSH.          Passed - No positive pregnancy test in past 12 months     If patient is pregnant or has had a positive pregnancy test, please check TSH.               Gail Scott RN 03/28/22 9:37 PM  "

## 2022-04-28 ENCOUNTER — APPOINTMENT (OUTPATIENT)
Dept: URBAN - METROPOLITAN AREA CLINIC 260 | Age: 72
Setting detail: DERMATOLOGY
End: 2022-05-02

## 2022-04-28 VITALS — WEIGHT: 156 LBS | HEIGHT: 67 IN

## 2022-04-28 DIAGNOSIS — D18.0 HEMANGIOMA: ICD-10-CM

## 2022-04-28 DIAGNOSIS — L57.8 OTHER SKIN CHANGES DUE TO CHRONIC EXPOSURE TO NONIONIZING RADIATION: ICD-10-CM

## 2022-04-28 DIAGNOSIS — L82.1 OTHER SEBORRHEIC KERATOSIS: ICD-10-CM

## 2022-04-28 DIAGNOSIS — E03.9 ACQUIRED HYPOTHYROIDISM: ICD-10-CM

## 2022-04-28 DIAGNOSIS — L60.3 NAIL DYSTROPHY: ICD-10-CM

## 2022-04-28 DIAGNOSIS — L82.0 INFLAMED SEBORRHEIC KERATOSIS: ICD-10-CM

## 2022-04-28 DIAGNOSIS — D22 MELANOCYTIC NEVI: ICD-10-CM

## 2022-04-28 PROBLEM — D22.5 MELANOCYTIC NEVI OF TRUNK: Status: ACTIVE | Noted: 2022-04-28

## 2022-04-28 PROBLEM — D18.01 HEMANGIOMA OF SKIN AND SUBCUTANEOUS TISSUE: Status: ACTIVE | Noted: 2022-04-28

## 2022-04-28 PROCEDURE — 17110 DESTRUCT B9 LESION 1-14: CPT

## 2022-04-28 PROCEDURE — OTHER COUNSELING: OTHER

## 2022-04-28 PROCEDURE — 99203 OFFICE O/P NEW LOW 30 MIN: CPT | Mod: 25

## 2022-04-28 PROCEDURE — OTHER LIQUID NITROGEN: OTHER

## 2022-04-28 PROCEDURE — OTHER ADDITIONAL NOTES: OTHER

## 2022-04-28 ASSESSMENT — LOCATION ZONE DERM
LOCATION ZONE: TRUNK
LOCATION ZONE: TOENAIL
LOCATION ZONE: FACE

## 2022-04-28 ASSESSMENT — LOCATION SIMPLE DESCRIPTION DERM
LOCATION SIMPLE: LEFT UPPER BACK
LOCATION SIMPLE: RIGHT UPPER BACK
LOCATION SIMPLE: LEFT SUBMANDIBULAR AREA
LOCATION SIMPLE: LEFT CHEEK
LOCATION SIMPLE: RIGHT GREAT TOE
LOCATION SIMPLE: LEFT GREAT TOE
LOCATION SIMPLE: UPPER BACK

## 2022-04-28 ASSESSMENT — LOCATION DETAILED DESCRIPTION DERM
LOCATION DETAILED: INFERIOR THORACIC SPINE
LOCATION DETAILED: RIGHT INFERIOR LATERAL UPPER BACK
LOCATION DETAILED: LEFT GREAT TOENAIL
LOCATION DETAILED: LEFT INFERIOR UPPER BACK
LOCATION DETAILED: LEFT SUPERIOR LATERAL MALAR CHEEK
LOCATION DETAILED: LEFT SUBMANDIBULAR AREA
LOCATION DETAILED: RIGHT MEDIAL UPPER BACK
LOCATION DETAILED: RIGHT INFERIOR MEDIAL UPPER BACK
LOCATION DETAILED: RIGHT GREAT TOENAIL

## 2022-04-28 NOTE — PROCEDURE: LIQUID NITROGEN
Include Z78.9 (Other Specified Conditions Influencing Health Status) As An Associated Diagnosis?: No
Show Aperture Variable?: Yes
Consent: The patient's consent was obtained including but not limited to risks of crusting, scabbing, blistering, scarring, darker or lighter pigmentary change, recurrence, incomplete removal and infection.
Spray Paint Text: The liquid nitrogen was applied to the skin utilizing a spray paint frosting technique.
Detail Level: Detailed
Post-Care Instructions: I reviewed with the patient in detail post-care instructions. Patient is to wear sunprotection, and avoid picking at any of the treated lesions. Pt may apply Vaseline to crusted or scabbing areas.
Medical Necessity Information: It is in your best interest to select a reason for this procedure from the list below. All of these items fulfill various CMS LCD requirements except the new and changing color options.
Medical Necessity Clause: This procedure was medically necessary because the lesions that were treated were:

## 2022-04-28 NOTE — PROCEDURE: ADDITIONAL NOTES
Detail Level: Simple
Additional Notes: Recommend new shoes, consider help from Podiatrist
Render Risk Assessment In Note?: no

## 2022-05-01 RX ORDER — LEVOTHYROXINE SODIUM 88 UG/1
TABLET ORAL
Qty: 30 TABLET | Refills: 0 | OUTPATIENT
Start: 2022-05-01

## 2022-05-01 NOTE — TELEPHONE ENCOUNTER
"Routing refill request to provider for review/approval because:  Labs not current:  TSH  Patient needs to be seen because it has been more than 1 year since last office visit.    Last Written Prescription Date:  3/29/22  Last Fill Quantity: 30,  # refills: 0   Last office visit provider:  3/16/21     Requested Prescriptions   Pending Prescriptions Disp Refills     levothyroxine (SYNTHROID/LEVOTHROID) 88 MCG tablet [Pharmacy Med Name: LEVOTHYROXINE 88 MCG TABLET] 30 tablet 0     Sig: TAKE 1 TABLET BY MOUTH EVERY DAY       Thyroid Protocol Failed - 5/1/2022 11:54 AM        Failed - Recent (12 mo) or future (30 days) visit within the authorizing provider's specialty     Patient has had an office visit with the authorizing provider or a provider within the authorizing providers department within the previous 12 mos or has a future within next 30 days. See \"Patient Info\" tab in inbasket, or \"Choose Columns\" in Meds & Orders section of the refill encounter.              Failed - Normal TSH on file in past 12 months     Recent Labs   Lab Test 03/16/21  0930   TSH 0.40              Passed - Patient is 12 years or older        Passed - Medication is active on med list        Passed - No active pregnancy on record     If patient is pregnant or has had a positive pregnancy test, please check TSH.          Passed - No positive pregnancy test in past 12 months     If patient is pregnant or has had a positive pregnancy test, please check TSH.               Jorden Goss RN 05/01/22 11:54 AM    "

## 2022-05-01 NOTE — TELEPHONE ENCOUNTER
Patient was given 30 days of medication a month ago and told to follow-up which she did not.  She needs to have an appointment before I will refill this.  Please get her scheduled and if you do, send this back for short-term refill.

## 2022-05-03 RX ORDER — LEVOTHYROXINE SODIUM 88 UG/1
88 TABLET ORAL DAILY
Qty: 30 TABLET | Refills: 0 | Status: SHIPPED | OUTPATIENT
Start: 2022-05-03 | End: 2022-05-14

## 2022-05-05 ENCOUNTER — OFFICE VISIT (OUTPATIENT)
Dept: FAMILY MEDICINE | Facility: CLINIC | Age: 72
End: 2022-05-05
Payer: MEDICARE

## 2022-05-05 VITALS
DIASTOLIC BLOOD PRESSURE: 76 MMHG | HEIGHT: 67 IN | WEIGHT: 157 LBS | BODY MASS INDEX: 24.64 KG/M2 | OXYGEN SATURATION: 97 % | SYSTOLIC BLOOD PRESSURE: 122 MMHG | HEART RATE: 83 BPM

## 2022-05-05 DIAGNOSIS — M81.0 AGE-RELATED OSTEOPOROSIS WITHOUT CURRENT PATHOLOGICAL FRACTURE: Chronic | ICD-10-CM

## 2022-05-05 DIAGNOSIS — Z12.11 SCREEN FOR COLON CANCER: ICD-10-CM

## 2022-05-05 DIAGNOSIS — E78.00 PURE HYPERCHOLESTEROLEMIA: Chronic | ICD-10-CM

## 2022-05-05 DIAGNOSIS — L29.9 ITCHING: ICD-10-CM

## 2022-05-05 DIAGNOSIS — Z00.00 ENCOUNTER FOR MEDICARE ANNUAL WELLNESS EXAM: Primary | ICD-10-CM

## 2022-05-05 DIAGNOSIS — E55.9 VITAMIN D DEFICIENCY: ICD-10-CM

## 2022-05-05 DIAGNOSIS — K21.00 GASTROESOPHAGEAL REFLUX DISEASE WITH ESOPHAGITIS WITHOUT HEMORRHAGE: ICD-10-CM

## 2022-05-05 DIAGNOSIS — G43.709 CHRONIC MIGRAINE WITHOUT AURA WITHOUT STATUS MIGRAINOSUS, NOT INTRACTABLE: ICD-10-CM

## 2022-05-05 DIAGNOSIS — E03.9 ACQUIRED HYPOTHYROIDISM: Chronic | ICD-10-CM

## 2022-05-05 LAB
ALBUMIN SERPL-MCNC: 3.9 G/DL (ref 3.5–5)
ALP SERPL-CCNC: 88 U/L (ref 45–120)
ALT SERPL W P-5'-P-CCNC: 12 U/L (ref 0–45)
ANION GAP SERPL CALCULATED.3IONS-SCNC: 12 MMOL/L (ref 5–18)
AST SERPL W P-5'-P-CCNC: 19 U/L (ref 0–40)
BILIRUB SERPL-MCNC: 1.2 MG/DL (ref 0–1)
BUN SERPL-MCNC: 22 MG/DL (ref 8–22)
CALCIUM SERPL-MCNC: 9.5 MG/DL (ref 8.5–10.5)
CHLORIDE BLD-SCNC: 107 MMOL/L (ref 98–107)
CHOLEST SERPL-MCNC: 211 MG/DL
CO2 SERPL-SCNC: 23 MMOL/L (ref 22–31)
CREAT SERPL-MCNC: 0.91 MG/DL (ref 0.6–1.1)
FASTING STATUS PATIENT QL REPORTED: ABNORMAL
GFR SERPL CREATININE-BSD FRML MDRD: 68 ML/MIN/1.73M2
GLUCOSE BLD-MCNC: 93 MG/DL (ref 70–125)
HDLC SERPL-MCNC: 59 MG/DL
LDLC SERPL CALC-MCNC: 132 MG/DL
POTASSIUM BLD-SCNC: 4.1 MMOL/L (ref 3.5–5)
PROT SERPL-MCNC: 7 G/DL (ref 6–8)
SODIUM SERPL-SCNC: 142 MMOL/L (ref 136–145)
TRIGL SERPL-MCNC: 102 MG/DL
TSH SERPL DL<=0.005 MIU/L-ACNC: 0.35 UIU/ML (ref 0.3–5)

## 2022-05-05 PROCEDURE — 82306 VITAMIN D 25 HYDROXY: CPT | Performed by: FAMILY MEDICINE

## 2022-05-05 PROCEDURE — 80061 LIPID PANEL: CPT | Performed by: FAMILY MEDICINE

## 2022-05-05 PROCEDURE — 36415 COLL VENOUS BLD VENIPUNCTURE: CPT | Performed by: FAMILY MEDICINE

## 2022-05-05 PROCEDURE — G0439 PPPS, SUBSEQ VISIT: HCPCS | Performed by: FAMILY MEDICINE

## 2022-05-05 PROCEDURE — 99214 OFFICE O/P EST MOD 30 MIN: CPT | Mod: 25 | Performed by: FAMILY MEDICINE

## 2022-05-05 PROCEDURE — 84443 ASSAY THYROID STIM HORMONE: CPT | Performed by: FAMILY MEDICINE

## 2022-05-05 PROCEDURE — 80053 COMPREHEN METABOLIC PANEL: CPT | Performed by: FAMILY MEDICINE

## 2022-05-05 RX ORDER — SUMATRIPTAN 50 MG/1
50 TABLET, FILM COATED ORAL
Qty: 12 TABLET | Refills: 3 | Status: SHIPPED | OUTPATIENT
Start: 2022-05-05

## 2022-05-05 RX ORDER — LEVOTHYROXINE SODIUM 88 UG/1
88 TABLET ORAL DAILY
Qty: 30 TABLET | Refills: 0 | Status: CANCELLED | OUTPATIENT
Start: 2022-05-05

## 2022-05-05 ASSESSMENT — ACTIVITIES OF DAILY LIVING (ADL): CURRENT_FUNCTION: NO ASSISTANCE NEEDED

## 2022-05-05 NOTE — PROGRESS NOTES
"SUBJECTIVE:   Joseline Welch is a 71 year old female who presents for Preventive Visit.  I last saw this patient about a year ago.  She has a history of hypothyroidism, osteoporosis, untreated hyperlipidemia, migraine headache and also has had a year or so with heartburn that comes in waves.  She takes Tums as needed.  She also describes having episodes of intense itching of palms and crotch/pubic area that last about 10 minutes in length and then are gone for a long time.  It will then again suddenly hit her.  She already saw her dermatologist last week but did not bring this up.  Last year I had given her a Cologuard order but she never used it.  She is still due for her Colorectal cancer screening.  She did her mammogram in December 2021 and her bone density in June 2018.  At that time she was seen to have osteoporosis and I suggested she repeat that test.  She needs her third COVID-vaccine and is due for a tetanus update and Shingrix as well through the pharmacy.    Patient has been advised of split billing requirements and indicates understanding: Yes  Are you in the first 12 months of your Medicare coverage?  No    Healthy Habits:     In general, how would you rate your overall health?  Excellent    Frequency of exercise:  4-5 days/week    Duration of exercise:  30-45 minutes    Do you usually eat at least 4 servings of fruit and vegetables a day, include whole grains    & fiber and avoid regularly eating high fat or \"junk\" foods?  No    Ability to successfully perform activities of daily living:  No assistance needed    Home Safety:  Lack of grab bars in the bathroom    Hearing Impairment:  Need to ask people to speak up or repeat themselves and difficulty understanding soft or whispered speech    In the past 6 months, have you been bothered by leaking of urine?  No    In general, how would you rate your overall mental or emotional health?  Good      PHQ-2 Total Score: 2    Additional concerns today:  " No    Do you feel safe in your environment? Yes    Have you ever done Advance Care Planning? (For example, a Health Directive, POLST, or a discussion with a medical provider or your loved ones about your wishes): No, advance care planning information given to patient to review.  Advanced care planning was discussed at today's visit.       Fall risk  Fallen 2 or more times in the past year?: No  Any fall with injury in the past year?: No    Cognitive Screening   1) Repeat 3 items (Leader, Season, Table)    2) Clock draw: NORMAL  3) 3 item recall: Recalls 3 objects  Results: 3 items recalled: COGNITIVE IMPAIRMENT LESS LIKELY    Mini-CogTM Copyright S Josh. Licensed by the author for use in [facility]; reprinted with permission (soob@[email or web address]). All rights reserved.      Do you have sleep apnea, excessive snoring or daytime drowsiness?: no    Reviewed and updated as needed this visit by clinical staff   Tobacco  Allergies  Meds                Reviewed and updated as needed this visit by Provider                   Social History     Tobacco Use     Smoking status: Former Smoker     Quit date: 2004     Years since quittin.3     Smokeless tobacco: Never Used   Substance Use Topics     Alcohol use: No     If you drink alcohol do you typically have >3 drinks per day or >7 drinks per week? No    Alcohol Use 2022   Prescreen: >3 drinks/day or >7 drinks/week? No   Prescreen: >3 drinks/day or >7 drinks/week? -             Current providers sharing in care for this patient include:   Patient Care Team:  Bertha Barrow MD as PCP - General  Bertha Barrow MD as Assigned PCP    The following health maintenance items are reviewed in Epic and correct as of today:  Health Maintenance Due   Topic Date Due     COLORECTAL CANCER SCREENING  Never done     ZOSTER IMMUNIZATION (1 of 2) Never done     DTAP/TDAP/TD IMMUNIZATION (2 - Td or Tdap) 2021     COVID-19 Vaccine (3 - Booster for Moderna  series) 08/10/2021     Labs reviewed in EPIC  BP Readings from Last 3 Encounters:   05/05/22 122/76   03/16/21 124/78   01/06/20 124/72    Wt Readings from Last 3 Encounters:   05/05/22 71.2 kg (157 lb)   03/16/21 70.8 kg (156 lb)   01/06/20 69.1 kg (152 lb 6.4 oz)                  Mammogram Screening: Mammogram Screening: Recommended mammography every 1-2 years with patient discussion and risk factor consideration  Last 3 Pap and HPV Results:   PAP / HPV Latest Ref Rng & Units 6/1/2018 4/15/2015   PAP - Negative for squamous intraepithelial lesion or malignancy  Electronically signed by Helen Arroyo CT (ASCP) on 6/8/2018 at  2:56 PM   Negative for squamous intraepithelial lesion or malignancy  Electronically signed by Annie Bourne CT (ASCP) on 4/27/2015 at  3:52 PM     HPV16 NEG Negative -   HPV18 NEG Negative -   HRHPV NEG Negative -       Breast CA Risk Assessment (FHS-7) 5/5/2022   Do you have a family history of breast, colon, or ovarian cancer? No / Unknown       click delete button to remove this line now  Mammogram Screening: Recommended mammography every 1-2 years with patient discussion and risk factor consideration  Pertinent mammograms are reviewed under the imaging tab.    Review of Systems  CONSTITUTIONAL: NEGATIVE for fever, chills, change in weight  INTEGUMENTARY/SKIN: NEGATIVE for worrisome rashes, moles or lesions, positive for episodes of pruritus  EYES: NEGATIVE for vision changes or irritation  ENT/MOUTH: POSITIVE for hearing loss  RESP: NEGATIVE for significant cough or SOB  BREAST: NEGATIVE for masses, tenderness or discharge  CV: NEGATIVE for chest pain, palpitations or peripheral edema  GI: POSITIVE for heartburn or reflux  : NEGATIVE for frequency, dysuria, or hematuria  MUSCULOSKELETAL: NEGATIVE for significant arthralgias or myalgia  NEURO: NEGATIVE for weakness, dizziness or paresthesias  ENDOCRINE: NEGATIVE for temperature intolerance, skin/hair changes  HEME: NEGATIVE  "for bleeding problems  PSYCHIATRIC: NEGATIVE for changes in mood or affect    OBJECTIVE:   /76   Pulse 83   Ht 1.702 m (5' 7\")   Wt 71.2 kg (157 lb)   SpO2 97%   Breastfeeding No   BMI 24.59 kg/m   Estimated body mass index is 24.59 kg/m  as calculated from the following:    Height as of this encounter: 1.702 m (5' 7\").    Weight as of this encounter: 71.2 kg (157 lb).  Physical Exam  General appearance - alert, well appearing, and in no distress and normal appearing weight  Mental status - normal mood, behavior, speech, dress, motor activity, and thought processes  Eyes - pupils equal and reactive, extraocular eye movements intact  Ears - bilateral TM's and external ear canals normal  Nose - normal and patent, no erythema, discharge   Mouth - mucous membranes moist, pharynx normal without lesions  Neck - supple, no significant adenopathy, carotid bruit noted, thyroid exam: thyroid is normal in size without nodules or tenderness  Lymphatics - no palpable lymphadenopathy, no hepatosplenomegaly  Chest - clear to auscultation, no wheezes, rales or rhonchi, symmetric air entry  Heart - normal rate and regular rhythm, S1 and S2 normal, no murmurs noted  Abdomen - soft, nontender, nondistended, no masses or organomegaly  Breasts - not examined  Pelvic - examination not indicated  Back exam - full range of motion, no tenderness, palpable spasm or pain on motion  Neurological - alert, oriented, normal speech, no focal findings or movement disorder noted, cranial nerves II through XII intact, DTR's normal and symmetric  Musculoskeletal - no joint tenderness, deformity or swelling  Extremities - peripheral pulses normal, no pedal edema, no clubbing or cyanosis  Skin - normal coloration and turgor, no rashes, no suspicious skin lesions noted      Labs reviewed in Epic  Results for orders placed or performed in visit on 05/05/22   TSH with free T4 reflex     Status: Normal   Result Value Ref Range    TSH 0.35 0.30 - " 5.00 uIU/mL   Comprehensive metabolic panel (BMP + Alb, Alk Phos, ALT, AST, Total. Bili, TP)     Status: Abnormal   Result Value Ref Range    Sodium 142 136 - 145 mmol/L    Potassium 4.1 3.5 - 5.0 mmol/L    Chloride 107 98 - 107 mmol/L    Carbon Dioxide (CO2) 23 22 - 31 mmol/L    Anion Gap 12 5 - 18 mmol/L    Urea Nitrogen 22 8 - 22 mg/dL    Creatinine 0.91 0.60 - 1.10 mg/dL    Calcium 9.5 8.5 - 10.5 mg/dL    Glucose 93 70 - 125 mg/dL    Alkaline Phosphatase 88 45 - 120 U/L    AST 19 0 - 40 U/L    ALT 12 0 - 45 U/L    Protein Total 7.0 6.0 - 8.0 g/dL    Albumin 3.9 3.5 - 5.0 g/dL    Bilirubin Total 1.2 (H) 0.0 - 1.0 mg/dL    GFR Estimate 68 >60 mL/min/1.73m2   Lipid panel reflex to direct LDL Fasting     Status: Abnormal   Result Value Ref Range    Cholesterol 211 (H) <=199 mg/dL    Triglycerides 102 <=149 mg/dL    Direct Measure HDL 59 >=50 mg/dL    LDL Cholesterol Calculated 132 (H) <=129 mg/dL    Patient Fasting > 8hrs? Unknown    Vitamin D deficiency screening     Status: Normal   Result Value Ref Range    Vitamin D, Total (25-Hydroxy) 26 20 - 75 ug/L    Narrative    Season, race, dietary intake, and treatment affect the concentration of 25-hydroxy-Vitamin D. Values may decrease during winter months and increase during summer months. Values 20-29 ug/L may indicate Vitamin D insufficiency and values <20 ug/L may indicate Vitamin D deficiency.    Vitamin D determination is routinely performed by an immunoassay specific for 25 hydroxyvitamin D3.  If an individual is on vitamin D2(ergocalciferol) supplementation, please specify 25 OH vitamin D2 and D3 level determination by LCMSMS test [id number].         ASSESSMENT / PLAN:     Encounter for Medicare annual wellness exam  Behind on her colon cancer screening.  Needs recheck of bone density.  Due for third COVID-vaccine, Shingrix and Tdap at pharmacy.    Acquired hypothyroidism  Will monitor labs and refill accordingly.  - TSH with free T4 reflex  - TSH with free T4  reflex  - levothyroxine (SYNTHROID/LEVOTHROID) 88 MCG tablet  Dispense: 90 tablet; Refill: 3    Essential Hypercholesterolemia  Currently not treating with medication.  Will monitor and make recommendation concerning treating.  - Comprehensive metabolic panel (BMP + Alb, Alk Phos, ALT, AST, Total. Bili, TP)  - Lipid panel reflex to direct LDL Fasting  - Comprehensive metabolic panel (BMP + Alb, Alk Phos, ALT, AST, Total. Bili, TP)  - Lipid panel reflex to direct LDL Fasting    Age-related osteoporosis without current pathological fracture  Last bone density in 2018 and overdue for recheck.  - DX Hip/Pelvis/Spine    Chronic migraine without aura without status migrainosus, not intractable  Needs refills of her Imitrex.  - SUMAtriptan (IMITREX) 50 MG tablet  Dispense: 12 tablet; Refill: 3    Gastroesophageal reflux disease with esophagitis without hemorrhage  Has been having trouble for the last year or more.  Currently treating with Tums.  Discussed possible use of Pepcid before bedtime on a regular basis.    Itching  Description of 10-minute bursts of intense pruritus in the pubic area and palms of the hand.  No lesions seen.  Just saw dermatology but did not mention this.  I am uncertain of the etiology and if it continues she may consider a return to dermatology or even a referral to the allergist.    Vitamin D deficiency  - Vitamin D deficiency screening  - Vitamin D deficiency screening    Screen for colon cancer  Does not want me to order her a new Cologuard test.  Will check at home to see if it still within its expiration date.    I will get back to her on her lab results.  She will check her Cologuard test.  She has declined any vaccines at this time.  She should follow-up in 1 year for her next med check or sooner if lab work dictates.    Patient has been advised of split billing requirements and indicates understanding: Yes    COUNSELING:  Reviewed preventive health counseling, as reflected in patient  "instructions    Estimated body mass index is 24.59 kg/m  as calculated from the following:    Height as of this encounter: 1.702 m (5' 7\").    Weight as of this encounter: 71.2 kg (157 lb).        She reports that she quit smoking about 18 years ago. She has never used smokeless tobacco.      Appropriate preventive services were discussed with this patient, including applicable screening as appropriate for cardiovascular disease, diabetes, osteopenia/osteoporosis, and glaucoma.  As appropriate for age/gender, discussed screening for colorectal cancer, prostate cancer, breast cancer, and cervical cancer. Checklist reviewing preventive services available has been given to the patient.    Reviewed patients plan of care and provided an AVS. The Complex Care Plan (for patients with higher acuity and needing more deliberate coordination of services) for Joseline meets the Care Plan requirement. This Care Plan has been established and reviewed with the Patient.    Counseling Resources:  ATP IV Guidelines  Pooled Cohorts Equation Calculator  Breast Cancer Risk Calculator  Breast Cancer: Medication to Reduce Risk  FRAX Risk Assessment  ICSI Preventive Guidelines  Dietary Guidelines for Americans, 2010  USDA's MyPlate  ASA Prophylaxis  Lung CA Screening    Bertha Barrow MD  [facility]    Identified Health Risks:    The patient was counseled and encouraged to consider modifying their diet and eating habits. She was provided with information on recommended healthy diet options.  The patient was provided with written information regarding signs of hearing loss.  "

## 2022-05-05 NOTE — PATIENT INSTRUCTIONS
Patient Education   Personalized Prevention Plan  You are due for the preventive services outlined below.  Your care team is available to assist you in scheduling these services.  If you have already completed any of these items, please share that information with your care team to update in your medical record.  Health Maintenance Due   Topic Date Due     Colorectal Cancer Screening  Never done     Zoster (Shingles) Vaccine (1 of 2) Never done     Diptheria Tetanus Pertussis (DTAP/TDAP/TD) Vaccine (1 - Tdap) 06/27/2011     COVID-19 Vaccine (3 - Booster for Moderna series) 08/10/2021     FALL RISK ASSESSMENT  03/16/2022       Understanding USDA MyPlate  The USDA has guidelines to help you make healthy food choices. These are called MyPlate. MyPlate shows the food groups that make up healthy meals using the image of a place setting. Before you eat, think about the healthiest choices for what to put on your plate or in your cup or bowl. To learn more about building a healthy plate, visit www.choosemyplate.gov.    The food groups    Fruits. Any fruit or 100% fruit juice counts as part of the Fruit Group. Fruits may be fresh, canned, frozen, or dried, and may be whole, cut-up, or pureed. Make 1/2 of your plate fruits and vegetables.    Vegetables. Any vegetable or 100% vegetable juice counts as a member of the Vegetable Group. Vegetables may be fresh, frozen, canned, or dried. They can be served raw or cooked and may be whole, cut-up, or mashed. Make 1/2 of your plate fruits and vegetables.    Grains. All foods made from grains are part of the Grains Group. These include wheat, rice, oats, cornmeal, and barley. Grains are often used to make foods such as bread, pasta, oatmeal, cereal, tortillas, and grits. Grains should be no more than 1/4 of your plate. At least half of your grains should be whole grains.    Protein. This group includes meat, poultry, seafood, beans and peas, eggs, processed soy products (such as  tofu), nuts (including nut butters), and seeds. Make protein choices no more than 1/4 of your plate. Meat and poultry choices should be lean or low fat.    Dairy. The Dairy Group includes all fluid milk products and foods made from milk that contain calcium, such as yogurt and cheese. (Foods that have little calcium, such as cream, butter, and cream cheese, are not part of this group.) Most dairy choices should be low-fat or fat-free.    Oils. Oils aren't a food group, but they do contain essential nutrients. However it's important to watch your intake of oils. These are fats that are liquid at room temperature. They include canola, corn, olive, soybean, vegetable, and sunflower oil. Foods that are mainly oil include mayonnaise, certain salad dressings, and soft margarines. You likely already get your daily oil allowance from the foods you eat.  Things to limit  Eating healthy also means limiting these things in your diet:       Salt (sodium). Many processed foods have a lot of sodium. To keep sodium intake down, eat fresh vegetables, meats, poultry, and seafood when possible. Purchase low-sodium, reduced-sodium, or no-salt-added food products at the store. And don't add salt to your meals at home. Instead, season them with herbs and spices such as dill, oregano, cumin, and paprika. Or try adding flavor with lemon or lime zest and juice.    Saturated fat. Saturated fats are most often found in animal products such as beef, pork, and chicken. They are often solid at room temperature, such as butter. To reduce your saturated fat intake, choose leaner cuts of meat and poultry. And try healthier cooking methods such as grilling, broiling, roasting, or baking. For a simple lower-fat swap, use plain nonfat yogurt instead of mayonnaise when making potato salad or macaroni salad.    Added sugars. These are sugars added to foods. They are in foods such as ice cream, candy, soda, fruit drinks, sports drinks, energy drinks,  cookies, pastries, jams, and syrups. Cut down on added sugars by sharing sweet treats with a family member or friend. You can also choose fruit for dessert, and drink water or other unsweetened beverages.     [employer] last reviewed this educational content on 6/1/2020 2000-2021 [employer]. All rights reserved. This information is not intended as a substitute for professional medical care. Always follow your healthcare professional's instructions.          Signs of Hearing Loss      Hearing much better with one ear can be a sign of hearing loss.   Hearing loss is a problem shared by many people. In fact, it is one of the most common health problems, particularly as people age. Most people age 65 and older have some hearing loss. By age 80, almost everyone does. Hearing loss often occurs slowly over the years. So you may not realize your hearing has gotten worse.  Have your hearing checked  Call your healthcare provider if you:    Have to strain to hear normal conversation    Have to watch other people s faces very carefully to follow what they re saying    Need to ask people to repeat what they ve said    Often misunderstand what people are saying    Turn the volume of the television or radio up so high that others complain    Feel that people are mumbling when they re talking to you    Find that the effort to hear leaves you feeling tired and irritated    Notice, when using the phone, that you hear better with one ear than the other  [employer] last reviewed this educational content on 1/1/2020 2000-2021 [employer]. All rights reserved. This information is not intended as a substitute for professional medical care. Always follow your healthcare professional's instructions.

## 2022-05-06 LAB — DEPRECATED CALCIDIOL+CALCIFEROL SERPL-MC: 26 UG/L (ref 20–75)

## 2022-05-14 RX ORDER — LEVOTHYROXINE SODIUM 88 UG/1
88 TABLET ORAL DAILY
Qty: 90 TABLET | Refills: 3 | Status: SHIPPED | OUTPATIENT
Start: 2022-05-14

## 2022-09-25 ENCOUNTER — HEALTH MAINTENANCE LETTER (OUTPATIENT)
Age: 72
End: 2022-09-25

## 2023-04-20 ENCOUNTER — PATIENT OUTREACH (OUTPATIENT)
Dept: CARE COORDINATION | Facility: CLINIC | Age: 73
End: 2023-04-20
Payer: MEDICARE

## 2023-05-04 ENCOUNTER — PATIENT OUTREACH (OUTPATIENT)
Dept: CARE COORDINATION | Facility: CLINIC | Age: 73
End: 2023-05-04
Payer: MEDICARE

## 2023-08-05 ENCOUNTER — HEALTH MAINTENANCE LETTER (OUTPATIENT)
Age: 73
End: 2023-08-05

## 2023-10-23 ENCOUNTER — APPOINTMENT (OUTPATIENT)
Dept: URBAN - METROPOLITAN AREA CLINIC 260 | Age: 73
Setting detail: DERMATOLOGY
End: 2023-10-24

## 2023-10-23 VITALS — HEIGHT: 55 IN | WEIGHT: 159 LBS | RESPIRATION RATE: 16 BRPM

## 2023-10-23 DIAGNOSIS — L82.0 INFLAMED SEBORRHEIC KERATOSIS: ICD-10-CM

## 2023-10-23 PROCEDURE — 17110 DESTRUCT B9 LESION 1-14: CPT

## 2023-10-23 PROCEDURE — OTHER LIQUID NITROGEN: OTHER

## 2023-10-23 PROCEDURE — OTHER COUNSELING: OTHER

## 2023-10-23 ASSESSMENT — LOCATION DETAILED DESCRIPTION DERM
LOCATION DETAILED: RIGHT CLAVICULAR NECK
LOCATION DETAILED: LEFT SUPERIOR LATERAL NECK
LOCATION DETAILED: RIGHT INFERIOR LATERAL NECK
LOCATION DETAILED: LEFT CLAVICULAR NECK
LOCATION DETAILED: LEFT SUPERIOR ANTERIOR NECK
LOCATION DETAILED: RIGHT POSTERIOR SHOULDER
LOCATION DETAILED: LEFT INFERIOR LATERAL NECK
LOCATION DETAILED: LEFT LATERAL SUPERIOR CHEST

## 2023-10-23 ASSESSMENT — LOCATION ZONE DERM
LOCATION ZONE: ARM
LOCATION ZONE: NECK
LOCATION ZONE: TRUNK

## 2023-10-23 ASSESSMENT — LOCATION SIMPLE DESCRIPTION DERM
LOCATION SIMPLE: CHEST
LOCATION SIMPLE: RIGHT SHOULDER
LOCATION SIMPLE: RIGHT ANTERIOR NECK
LOCATION SIMPLE: LEFT ANTERIOR NECK

## 2023-10-23 NOTE — HPI: SKIN LESIONS
How Severe Is Your Skin Lesion?: mild
Have Your Skin Lesions Been Treated?: not been treated
Is This A New Presentation, Or A Follow-Up?: Skin Lesions
Additional History: The patient states there are lesions, “skin tags,” she noted, on her neck that causes irritation when wear jewelry and rubs on clothing. Reports tender sometimes.

## 2023-11-14 ENCOUNTER — PATIENT OUTREACH (OUTPATIENT)
Dept: CARE COORDINATION | Facility: CLINIC | Age: 73
End: 2023-11-14
Payer: MEDICARE

## 2023-12-12 ENCOUNTER — PATIENT OUTREACH (OUTPATIENT)
Dept: CARE COORDINATION | Facility: CLINIC | Age: 73
End: 2023-12-12
Payer: MEDICARE

## 2024-08-14 ENCOUNTER — PATIENT OUTREACH (OUTPATIENT)
Dept: CARE COORDINATION | Facility: CLINIC | Age: 74
End: 2024-08-14
Payer: MEDICARE

## 2025-04-26 ENCOUNTER — HEALTH MAINTENANCE LETTER (OUTPATIENT)
Age: 75
End: 2025-04-26